# Patient Record
Sex: MALE | Race: BLACK OR AFRICAN AMERICAN | NOT HISPANIC OR LATINO | URBAN - METROPOLITAN AREA
[De-identification: names, ages, dates, MRNs, and addresses within clinical notes are randomized per-mention and may not be internally consistent; named-entity substitution may affect disease eponyms.]

---

## 2016-02-24 RX ORDER — TACROLIMUS 5 MG/1
4 CAPSULE ORAL
Qty: 0 | Refills: 0 | COMMUNITY
Start: 2016-02-24

## 2017-07-30 ENCOUNTER — EMERGENCY (EMERGENCY)
Facility: HOSPITAL | Age: 52
LOS: 1 days | Discharge: HOME | End: 2017-07-30
Attending: EMERGENCY MEDICINE | Admitting: EMERGENCY MEDICINE
Payer: MEDICARE

## 2017-07-30 VITALS
RESPIRATION RATE: 18 BRPM | OXYGEN SATURATION: 99 % | HEART RATE: 66 BPM | SYSTOLIC BLOOD PRESSURE: 159 MMHG | DIASTOLIC BLOOD PRESSURE: 74 MMHG

## 2017-07-30 VITALS
HEART RATE: 73 BPM | OXYGEN SATURATION: 98 % | DIASTOLIC BLOOD PRESSURE: 78 MMHG | SYSTOLIC BLOOD PRESSURE: 182 MMHG | TEMPERATURE: 98 F | RESPIRATION RATE: 18 BRPM

## 2017-07-30 DIAGNOSIS — Z94.0 KIDNEY TRANSPLANT STATUS: Chronic | ICD-10-CM

## 2017-07-30 LAB
ALBUMIN SERPL ELPH-MCNC: 4.6 G/DL — SIGNIFICANT CHANGE UP (ref 3.3–5)
ALP SERPL-CCNC: 70 U/L — SIGNIFICANT CHANGE UP (ref 40–120)
ALT FLD-CCNC: 19 U/L RC — SIGNIFICANT CHANGE UP (ref 10–45)
ANION GAP SERPL CALC-SCNC: 13 MMOL/L — SIGNIFICANT CHANGE UP (ref 5–17)
APPEARANCE UR: CLEAR — SIGNIFICANT CHANGE UP
APTT BLD: 32.6 SEC — SIGNIFICANT CHANGE UP (ref 27.5–37.4)
AST SERPL-CCNC: 23 U/L — SIGNIFICANT CHANGE UP (ref 10–40)
BACTERIA # UR AUTO: ABNORMAL /HPF
BASOPHILS # BLD AUTO: 0.1 K/UL — SIGNIFICANT CHANGE UP (ref 0–0.2)
BASOPHILS NFR BLD AUTO: 0.9 % — SIGNIFICANT CHANGE UP (ref 0–2)
BILIRUB SERPL-MCNC: 0.3 MG/DL — SIGNIFICANT CHANGE UP (ref 0.2–1.2)
BILIRUB UR-MCNC: NEGATIVE — SIGNIFICANT CHANGE UP
BUN SERPL-MCNC: 40 MG/DL — HIGH (ref 7–23)
CALCIUM SERPL-MCNC: 10.9 MG/DL — HIGH (ref 8.4–10.5)
CHLORIDE SERPL-SCNC: 101 MMOL/L — SIGNIFICANT CHANGE UP (ref 96–108)
CO2 SERPL-SCNC: 22 MMOL/L — SIGNIFICANT CHANGE UP (ref 22–31)
COLOR SPEC: COLORLESS — SIGNIFICANT CHANGE UP
CREAT SERPL-MCNC: 1.89 MG/DL — HIGH (ref 0.5–1.3)
DIFF PNL FLD: NEGATIVE — SIGNIFICANT CHANGE UP
EOSINOPHIL # BLD AUTO: 0.2 K/UL — SIGNIFICANT CHANGE UP (ref 0–0.5)
EOSINOPHIL NFR BLD AUTO: 2.4 % — SIGNIFICANT CHANGE UP (ref 0–6)
GLUCOSE SERPL-MCNC: 100 MG/DL — HIGH (ref 70–99)
GLUCOSE UR QL: NEGATIVE — SIGNIFICANT CHANGE UP
HCT VFR BLD CALC: 44.7 % — SIGNIFICANT CHANGE UP (ref 39–50)
HGB BLD-MCNC: 15.3 G/DL — SIGNIFICANT CHANGE UP (ref 13–17)
INR BLD: 0.95 RATIO — SIGNIFICANT CHANGE UP (ref 0.88–1.16)
KETONES UR-MCNC: NEGATIVE — SIGNIFICANT CHANGE UP
LEUKOCYTE ESTERASE UR-ACNC: NEGATIVE — SIGNIFICANT CHANGE UP
LYMPHOCYTES # BLD AUTO: 1.2 K/UL — SIGNIFICANT CHANGE UP (ref 1–3.3)
LYMPHOCYTES # BLD AUTO: 15.8 % — SIGNIFICANT CHANGE UP (ref 13–44)
MCHC RBC-ENTMCNC: 33.7 PG — SIGNIFICANT CHANGE UP (ref 27–34)
MCHC RBC-ENTMCNC: 34.2 GM/DL — SIGNIFICANT CHANGE UP (ref 32–36)
MCV RBC AUTO: 98.7 FL — SIGNIFICANT CHANGE UP (ref 80–100)
MONOCYTES # BLD AUTO: 0.7 K/UL — SIGNIFICANT CHANGE UP (ref 0–0.9)
MONOCYTES NFR BLD AUTO: 10 % — SIGNIFICANT CHANGE UP (ref 2–14)
NEUTROPHILS # BLD AUTO: 5.2 K/UL — SIGNIFICANT CHANGE UP (ref 1.8–7.4)
NEUTROPHILS NFR BLD AUTO: 71 % — SIGNIFICANT CHANGE UP (ref 43–77)
NITRITE UR-MCNC: NEGATIVE — SIGNIFICANT CHANGE UP
PH UR: 7 — SIGNIFICANT CHANGE UP (ref 5–8)
PLATELET # BLD AUTO: 130 K/UL — LOW (ref 150–400)
POTASSIUM SERPL-MCNC: 5.7 MMOL/L — HIGH (ref 3.5–5.3)
POTASSIUM SERPL-SCNC: 5.7 MMOL/L — HIGH (ref 3.5–5.3)
PROT SERPL-MCNC: 8.2 G/DL — SIGNIFICANT CHANGE UP (ref 6–8.3)
PROT UR-MCNC: NEGATIVE — SIGNIFICANT CHANGE UP
PROTHROM AB SERPL-ACNC: 10.3 SEC — SIGNIFICANT CHANGE UP (ref 9.8–12.7)
RBC # BLD: 4.53 M/UL — SIGNIFICANT CHANGE UP (ref 4.2–5.8)
RBC # FLD: 14 % — SIGNIFICANT CHANGE UP (ref 10.3–14.5)
RBC CASTS # UR COMP ASSIST: SIGNIFICANT CHANGE UP /HPF (ref 0–2)
SODIUM SERPL-SCNC: 136 MMOL/L — SIGNIFICANT CHANGE UP (ref 135–145)
SP GR SPEC: 1.01 — LOW (ref 1.01–1.02)
TACROLIMUS SERPL-MCNC: 7 NG/ML — SIGNIFICANT CHANGE UP
UROBILINOGEN FLD QL: NEGATIVE — SIGNIFICANT CHANGE UP
WBC # BLD: 7.4 K/UL — SIGNIFICANT CHANGE UP (ref 3.8–10.5)
WBC # FLD AUTO: 7.4 K/UL — SIGNIFICANT CHANGE UP (ref 3.8–10.5)
WBC UR QL: SIGNIFICANT CHANGE UP /HPF (ref 0–5)

## 2017-07-30 PROCEDURE — 85610 PROTHROMBIN TIME: CPT

## 2017-07-30 PROCEDURE — 87086 URINE CULTURE/COLONY COUNT: CPT

## 2017-07-30 PROCEDURE — 81001 URINALYSIS AUTO W/SCOPE: CPT

## 2017-07-30 PROCEDURE — 80197 ASSAY OF TACROLIMUS: CPT

## 2017-07-30 PROCEDURE — 99283 EMERGENCY DEPT VISIT LOW MDM: CPT

## 2017-07-30 PROCEDURE — 80053 COMPREHEN METABOLIC PANEL: CPT

## 2017-07-30 PROCEDURE — 99284 EMERGENCY DEPT VISIT MOD MDM: CPT | Mod: GC

## 2017-07-30 PROCEDURE — 85027 COMPLETE CBC AUTOMATED: CPT

## 2017-07-30 PROCEDURE — 85730 THROMBOPLASTIN TIME PARTIAL: CPT

## 2017-07-30 NOTE — ED PROVIDER NOTE - PROGRESS NOTE DETAILS
Spoke with patient's nephrologist, Dr. Nascimento, who states that patient's creatinine currently near baseline. No indication to keep patient in hospital. Will DC home with nephro follow up. Yahaira Arellano DO

## 2017-07-30 NOTE — ED PROVIDER NOTE - ATTENDING CONTRIBUTION TO CARE
I was physically present for the E/M service provided. I agree with above history, physical, and plan which I have reviewed and edited where appropriate. I was physically present for the key portions of the service provided.    50yo male PMH anemia, endocarditis, ESRD on HD, HIV, HTN, s/p renal transplant 2015 in [place] p/w right groin pain with urination.  -NAD, abdomen soft NTND, no CVA TTP, afebrile  -Check UA and renal function d/w pt's nephrologist

## 2017-07-30 NOTE — ED ADULT TRIAGE NOTE - CHIEF COMPLAINT QUOTE
R lower abdominal pain at kidney transplant site (2 years ago), saw nephrologist on wed/ thurs who stated an increase in creatinine   also c/o sore throat  denies hematuria R lower abdominal pain at kidney transplant site (2 years ago), patient states "pulling sensation"  saw nephrologist on wed/ thurs who stated an increase in creatinine   also c/o sore throat and increase in urinary frequency   denies hematuria

## 2017-07-30 NOTE — ED PROVIDER NOTE - PMH
Anemia  s/p transfusion 1 week ago 2013  Cerebral aneurysm without rupture    Endocarditis  10/ 2014 admitted & treated  ESRD on Dialysis  Tues /thurs/ saterday  GI (gastrointestinal bleed)    HIV (human immunodeficiency virus infection)    HTN - Hypertension    Human Immunodeficiency Virus [HIV] Disease  x 2010  Kidney transplanted    Mitral valve regurgitation    Sinus bradycardia  Pacemaker 10/2013  ST Umair Model 3327

## 2017-07-30 NOTE — ED ADULT NURSE NOTE - PMH
Anemia  s/p transfusion 1 week ago 2013  Cerebral aneurysm without rupture    Endocarditis  10/ 2014 admitted & treated  ESRD on Dialysis  Tues /thurs/ saterday  GI (gastrointestinal bleed)    HIV (human immunodeficiency virus infection)    HTN - Hypertension    Human Immunodeficiency Virus [HIV] Disease  x 2010  Kidney transplanted    Mitral valve regurgitation    Sinus bradycardia  Pacemaker 10/2013  ST Umair Model 8297

## 2017-07-30 NOTE — ED ADULT NURSE NOTE - OBJECTIVE STATEMENT
Pt is a 51YOM hx of anemia, endocarditis, ESRD, HIV, HTN, s/p renal transplant 2015([place])  received ambulatory A&Ox4 complaining of intermittent right groin pain during urination x1 week. Pt states that he saw his nephrologist last week and was told that his creatinine was elevated (3.0) Pt states that he was told to have his labs redrawn but "has been busy". Pt denies any pain at rest, no headache, dizziness, chest pain SOB nausea vomiting fever or chills. Patient concerned that his kidney is failing.

## 2017-07-30 NOTE — ED PROVIDER NOTE - OBJECTIVE STATEMENT
52yo male PMH anemia, endocarditis, ESRD on HD, HIV, HTN, s/p renal transplant 2015 in [place] presenting with right groin pain during urination, saw his nephrologist last week and was told that his creatinine was elevated. Patient concerned that his kidney is failing.     Nephro: Dr. Alvarez Daugherty/Radha

## 2017-07-30 NOTE — ED ADULT NURSE NOTE - CHIEF COMPLAINT QUOTE
R lower abdominal pain at kidney transplant site (2 years ago), patient states "pulling sensation"  saw nephrologist on wed/ thurs who stated an increase in creatinine   also c/o sore throat and increase in urinary frequency   denies hematuria

## 2017-07-30 NOTE — ED PROVIDER NOTE - PLAN OF CARE
1. Follow up with your nephrologist within 2-3days for reevaluation.  2.  Return to the Emergency Department for worsening, progressive or any other concerning symptoms.

## 2017-07-30 NOTE — ED PROVIDER NOTE - MEDICAL DECISION MAKING DETAILS
50yo male with PMH HIV, renal transplant presenting with right groin pulling and concern for rejection, will check labs, UA, talk to patient's nephrologist, reevaluate. Yahaira Arellano DO

## 2017-07-30 NOTE — ED PROVIDER NOTE - CARE PLAN
Principal Discharge DX:	Groin discomfort, right  Instructions for follow-up, activity and diet:	1. Follow up with your nephrologist within 2-3days for reevaluation.  2.  Return to the Emergency Department for worsening, progressive or any other concerning symptoms.
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
## 2017-07-30 NOTE — ED PROVIDER NOTE - PHYSICAL EXAMINATION
Gen: NAD, AOx3  Head: NCAT  Lung: CTAB, no respiratory distress, no wheezing, rales, rhonchi  CV: normal s1/s2, rrr, no murmurs, Normal perfusion, pulses 2+ throughout  Abd: soft, NTND, no CVA tenderness  MSK: No edema, no visible deformities, full range of motion in all 4 extremities  Neuro: CN II-XII grossly intact, No focal neurologic deficits  Skin: No rash   Psych: normal affect

## 2017-07-31 LAB
CULTURE RESULTS: NO GROWTH — SIGNIFICANT CHANGE UP
SPECIMEN SOURCE: SIGNIFICANT CHANGE UP

## 2017-12-13 ENCOUNTER — APPOINTMENT (OUTPATIENT)
Dept: NEPHROLOGY | Facility: CLINIC | Age: 52
End: 2017-12-13

## 2018-07-12 ENCOUNTER — APPOINTMENT (OUTPATIENT)
Dept: NEPHROLOGY | Facility: CLINIC | Age: 53
End: 2018-07-12
Payer: MEDICARE

## 2018-07-12 VITALS
SYSTOLIC BLOOD PRESSURE: 155 MMHG | BODY MASS INDEX: 22.26 KG/M2 | HEIGHT: 73 IN | HEART RATE: 77 BPM | TEMPERATURE: 98.3 F | WEIGHT: 168 LBS | DIASTOLIC BLOOD PRESSURE: 67 MMHG | RESPIRATION RATE: 14 BRPM | OXYGEN SATURATION: 96 %

## 2018-07-12 DIAGNOSIS — I35.9 NONRHEUMATIC AORTIC VALVE DISORDER, UNSPECIFIED: ICD-10-CM

## 2018-07-12 DIAGNOSIS — B20 HUMAN IMMUNODEFICIENCY VIRUS [HIV] DISEASE: ICD-10-CM

## 2018-07-12 PROCEDURE — 99205 OFFICE O/P NEW HI 60 MIN: CPT

## 2018-07-27 LAB
ALBUMIN SERPL ELPH-MCNC: 4.5 G/DL
ALP BLD-CCNC: 74 U/L
ALT SERPL-CCNC: 16 U/L
ANION GAP SERPL CALC-SCNC: 17 MMOL/L
APPEARANCE: CLEAR
AST SERPL-CCNC: 28 U/L
BASOPHILS # BLD AUTO: 0.06 K/UL
BASOPHILS NFR BLD AUTO: 1.1 %
BILIRUB SERPL-MCNC: 0.4 MG/DL
BILIRUBIN URINE: NEGATIVE
BKV DNA SPEC QL NAA+PROBE: NORMAL
BLOOD URINE: NEGATIVE
BUN SERPL-MCNC: 34 MG/DL
CALCIUM SERPL-MCNC: 11.3 MG/DL
CD3 CELLS # BLD: 1036 /UL
CD3 CELLS NFR BLD: 59 %
CD3+CD4+ CELLS # BLD: 513 /UL
CD3+CD4+ CELLS NFR BLD: 29 %
CD3+CD4+ CELLS/CD3+CD8+ CLL SPEC: 1.08 RATIO
CD3+CD8+ CELLS # SPEC: 475 /UL
CD3+CD8+ CELLS NFR BLD: 27 %
CHLORIDE SERPL-SCNC: 106 MMOL/L
CHOLEST SERPL-MCNC: 182 MG/DL
CHOLEST/HDLC SERPL: 2.5 RATIO
CMV DNA SPEC QL NAA+PROBE: NOT DETECTED IU/ML
CO2 SERPL-SCNC: 17 MMOL/L
COLOR: YELLOW
CREAT SERPL-MCNC: 2.58 MG/DL
CREAT SPEC-SCNC: 253 MG/DL
CREAT/PROT UR: 0.1 RATIO
EOSINOPHIL # BLD AUTO: 0.29 K/UL
EOSINOPHIL NFR BLD AUTO: 5.2 %
GLUCOSE QUALITATIVE U: NEGATIVE MG/DL
GLUCOSE SERPL-MCNC: 66 MG/DL
HBA1C MFR BLD HPLC: 5.4 %
HCT VFR BLD CALC: 45.4 %
HDLC SERPL-MCNC: 72 MG/DL
HGB BLD-MCNC: 14.6 G/DL
HIV1 RNA # SERPL NAA+PROBE: ABNORMAL
HIV1 RNA # SERPL NAA+PROBE: ABNORMAL COPIES/ML
IMM GRANULOCYTES NFR BLD AUTO: 0.9 %
KETONES URINE: NEGATIVE
LDH SERPL-CCNC: 360 U/L
LDLC SERPL CALC-MCNC: 95 MG/DL
LEUKOCYTE ESTERASE URINE: NEGATIVE
LYMPHOCYTES # BLD AUTO: 1.75 K/UL
LYMPHOCYTES NFR BLD AUTO: 31.1 %
MAGNESIUM SERPL-MCNC: 2.1 MG/DL
MAN DIFF?: NORMAL
MCHC RBC-ENTMCNC: 30.5 PG
MCHC RBC-ENTMCNC: 32.2 GM/DL
MCV RBC AUTO: 95 FL
MONOCYTES # BLD AUTO: 0.62 K/UL
MONOCYTES NFR BLD AUTO: 11 %
NEUTROPHILS # BLD AUTO: 2.86 K/UL
NEUTROPHILS NFR BLD AUTO: 50.7 %
NITRITE URINE: NEGATIVE
PH URINE: 5
PHOSPHATE SERPL-MCNC: 3.5 MG/DL
PLATELET # BLD AUTO: 138 K/UL
POTASSIUM SERPL-SCNC: 5.8 MMOL/L
PROT SERPL-MCNC: 8.4 G/DL
PROT UR-MCNC: 15 MG/DL
PROTEIN URINE: NEGATIVE MG/DL
RBC # BLD: 4.78 M/UL
RBC # FLD: 15.7 %
SODIUM SERPL-SCNC: 140 MMOL/L
SPECIFIC GRAVITY URINE: 1.02
TACROLIMUS SERPL-MCNC: 11.4 NG/ML
TRIGL SERPL-MCNC: 75 MG/DL
URATE SERPL-MCNC: 7.8 MG/DL
UROBILINOGEN URINE: NEGATIVE MG/DL
VIRAL LOAD INTERP: NORMAL
VIRAL LOAD LOG: ABNORMAL LG COP/ML
WBC # FLD AUTO: 5.63 K/UL

## 2018-08-01 ENCOUNTER — APPOINTMENT (OUTPATIENT)
Dept: INFECTIOUS DISEASE | Facility: CLINIC | Age: 53
End: 2018-08-01
Payer: MEDICARE

## 2018-08-01 VITALS
BODY MASS INDEX: 21.87 KG/M2 | OXYGEN SATURATION: 99 % | SYSTOLIC BLOOD PRESSURE: 196 MMHG | TEMPERATURE: 98.5 F | DIASTOLIC BLOOD PRESSURE: 99 MMHG | HEART RATE: 85 BPM | HEIGHT: 73 IN | WEIGHT: 165 LBS

## 2018-08-01 VITALS — DIASTOLIC BLOOD PRESSURE: 82 MMHG | SYSTOLIC BLOOD PRESSURE: 164 MMHG

## 2018-08-01 DIAGNOSIS — Z92.89 PERSONAL HISTORY OF OTHER MEDICAL TREATMENT: ICD-10-CM

## 2018-08-01 DIAGNOSIS — I10 ESSENTIAL (PRIMARY) HYPERTENSION: ICD-10-CM

## 2018-08-01 PROCEDURE — 99205 OFFICE O/P NEW HI 60 MIN: CPT

## 2018-08-03 PROBLEM — I10 HYPERTENSION: Status: ACTIVE | Noted: 2018-08-03

## 2018-08-03 LAB
25(OH)D3 SERPL-MCNC: 24.6 NG/ML
ALBUMIN SERPL ELPH-MCNC: 4.5 G/DL
ALP BLD-CCNC: 72 U/L
ALT SERPL-CCNC: 10 U/L
ANION GAP SERPL CALC-SCNC: 14 MMOL/L
AST SERPL-CCNC: 23 U/L
BILIRUB SERPL-MCNC: 0.3 MG/DL
BUN SERPL-MCNC: 33 MG/DL
C TRACH RRNA SPEC QL NAA+PROBE: NOT DETECTED
CALCIUM SERPL-MCNC: 11 MG/DL
CHLORIDE SERPL-SCNC: 104 MMOL/L
CO2 SERPL-SCNC: 20 MMOL/L
CREAT SERPL-MCNC: 2.45 MG/DL
GLUCOSE SERPL-MCNC: 84 MG/DL
HBV CORE IGG+IGM SER QL: REACTIVE
HBV SURFACE AB SER QL: REACTIVE
HBV SURFACE AG SER QL: NONREACTIVE
HCV AB SER QL: NONREACTIVE
HCV S/CO RATIO: 0.09 S/CO
HEPATITIS A IGG ANTIBODY: NONREACTIVE
N GONORRHOEA RRNA SPEC QL NAA+PROBE: NOT DETECTED
POTASSIUM SERPL-SCNC: 5 MMOL/L
PROT SERPL-MCNC: 8 G/DL
PSA SERPL-MCNC: 1.9 NG/ML
SODIUM SERPL-SCNC: 138 MMOL/L
SOURCE AMPLIFICATION: NORMAL
T PALLIDUM AB SER QL IA: NEGATIVE

## 2018-08-09 ENCOUNTER — RX RENEWAL (OUTPATIENT)
Age: 53
End: 2018-08-09

## 2018-08-13 ENCOUNTER — MEDICATION RENEWAL (OUTPATIENT)
Age: 53
End: 2018-08-13

## 2018-08-16 ENCOUNTER — APPOINTMENT (OUTPATIENT)
Dept: ULTRASOUND IMAGING | Facility: HOSPITAL | Age: 53
End: 2018-08-16

## 2018-08-16 ENCOUNTER — RX RENEWAL (OUTPATIENT)
Age: 53
End: 2018-08-16

## 2018-09-19 ENCOUNTER — MEDICATION RENEWAL (OUTPATIENT)
Age: 53
End: 2018-09-19

## 2018-09-20 LAB
CALCIUM SERPL-MCNC: 11 MG/DL
PARATHYROID HORMONE INTACT: 134 PG/ML

## 2018-12-31 ENCOUNTER — RX RENEWAL (OUTPATIENT)
Age: 53
End: 2018-12-31

## 2019-03-11 ENCOUNTER — APPOINTMENT (OUTPATIENT)
Dept: INFECTIOUS DISEASE | Facility: CLINIC | Age: 54
End: 2019-03-11

## 2019-04-19 ENCOUNTER — INPATIENT (INPATIENT)
Facility: HOSPITAL | Age: 54
LOS: 3 days | Discharge: HOME | DRG: 871 | End: 2019-04-23
Attending: INTERNAL MEDICINE | Admitting: INTERNAL MEDICINE
Payer: MEDICARE

## 2019-04-19 VITALS
DIASTOLIC BLOOD PRESSURE: 92 MMHG | WEIGHT: 160.06 LBS | HEIGHT: 73 IN | RESPIRATION RATE: 18 BRPM | TEMPERATURE: 98 F | HEART RATE: 96 BPM | OXYGEN SATURATION: 95 % | SYSTOLIC BLOOD PRESSURE: 202 MMHG

## 2019-04-19 DIAGNOSIS — B20 HUMAN IMMUNODEFICIENCY VIRUS [HIV] DISEASE: ICD-10-CM

## 2019-04-19 DIAGNOSIS — R06.02 SHORTNESS OF BREATH: ICD-10-CM

## 2019-04-19 DIAGNOSIS — Z94.0 KIDNEY TRANSPLANT STATUS: ICD-10-CM

## 2019-04-19 DIAGNOSIS — Z29.9 ENCOUNTER FOR PROPHYLACTIC MEASURES, UNSPECIFIED: ICD-10-CM

## 2019-04-19 DIAGNOSIS — I10 ESSENTIAL (PRIMARY) HYPERTENSION: ICD-10-CM

## 2019-04-19 DIAGNOSIS — D89.9 DISORDER INVOLVING THE IMMUNE MECHANISM, UNSPECIFIED: ICD-10-CM

## 2019-04-19 DIAGNOSIS — J43.9 EMPHYSEMA, UNSPECIFIED: ICD-10-CM

## 2019-04-19 DIAGNOSIS — Z94.0 KIDNEY TRANSPLANT STATUS: Chronic | ICD-10-CM

## 2019-04-19 LAB
4/8 RATIO: 1.26 RATIO — SIGNIFICANT CHANGE UP (ref 0.9–3.6)
ABS CD8: 401 /UL — SIGNIFICANT CHANGE UP (ref 142–740)
ALBUMIN SERPL ELPH-MCNC: 4.4 G/DL — SIGNIFICANT CHANGE UP (ref 3.3–5)
ALP SERPL-CCNC: 87 U/L — SIGNIFICANT CHANGE UP (ref 40–120)
ALT FLD-CCNC: 15 U/L — SIGNIFICANT CHANGE UP (ref 10–45)
ANION GAP SERPL CALC-SCNC: 9 MMOL/L — SIGNIFICANT CHANGE UP (ref 5–17)
APPEARANCE UR: CLEAR — SIGNIFICANT CHANGE UP
APTT BLD: 33.3 SEC — SIGNIFICANT CHANGE UP (ref 27.5–36.3)
AST SERPL-CCNC: 29 U/L — SIGNIFICANT CHANGE UP (ref 10–40)
BASOPHILS # BLD AUTO: 0.1 K/UL — SIGNIFICANT CHANGE UP (ref 0–0.2)
BILIRUB SERPL-MCNC: 0.4 MG/DL — SIGNIFICANT CHANGE UP (ref 0.2–1.2)
BILIRUB UR-MCNC: NEGATIVE — SIGNIFICANT CHANGE UP
BUN SERPL-MCNC: 39 MG/DL — HIGH (ref 7–23)
CALCIUM SERPL-MCNC: 11.1 MG/DL — HIGH (ref 8.4–10.5)
CD3 BLASTS SPEC-ACNC: 49 % — LOW (ref 59–83)
CD3 BLASTS SPEC-ACNC: 986 /UL — SIGNIFICANT CHANGE UP (ref 672–1870)
CD4 %: 25 % — LOW (ref 30–62)
CD8 %: 20 % — SIGNIFICANT CHANGE UP (ref 12–36)
CHLORIDE SERPL-SCNC: 104 MMOL/L — SIGNIFICANT CHANGE UP (ref 96–108)
CO2 SERPL-SCNC: 22 MMOL/L — SIGNIFICANT CHANGE UP (ref 22–31)
COLOR SPEC: SIGNIFICANT CHANGE UP
CREAT SERPL-MCNC: 2.21 MG/DL — HIGH (ref 0.5–1.3)
DIFF PNL FLD: NEGATIVE — SIGNIFICANT CHANGE UP
EOSINOPHIL # BLD AUTO: 5.6 K/UL — HIGH (ref 0–0.5)
EOSINOPHIL NFR BLD AUTO: 41 % — HIGH (ref 0–6)
GAS PNL BLDV: SIGNIFICANT CHANGE UP
GLUCOSE SERPL-MCNC: 113 MG/DL — HIGH (ref 70–99)
GLUCOSE UR QL: NEGATIVE — SIGNIFICANT CHANGE UP
HCT VFR BLD CALC: 43.2 % — SIGNIFICANT CHANGE UP (ref 39–50)
HGB BLD-MCNC: 13.7 G/DL — SIGNIFICANT CHANGE UP (ref 13–17)
INR BLD: 1.03 RATIO — SIGNIFICANT CHANGE UP (ref 0.88–1.16)
KETONES UR-MCNC: NEGATIVE — SIGNIFICANT CHANGE UP
LDH SERPL L TO P-CCNC: 329 U/L — HIGH (ref 50–242)
LEUKOCYTE ESTERASE UR-ACNC: NEGATIVE — SIGNIFICANT CHANGE UP
LYMPHOCYTES # BLD AUTO: 1.6 K/UL — SIGNIFICANT CHANGE UP (ref 1–3.3)
LYMPHOCYTES # BLD AUTO: 17 % — SIGNIFICANT CHANGE UP (ref 13–44)
MCHC RBC-ENTMCNC: 30 PG — SIGNIFICANT CHANGE UP (ref 27–34)
MCHC RBC-ENTMCNC: 31.6 GM/DL — LOW (ref 32–36)
MCV RBC AUTO: 95.1 FL — SIGNIFICANT CHANGE UP (ref 80–100)
MONOCYTES # BLD AUTO: 1 K/UL — HIGH (ref 0–0.9)
MONOCYTES NFR BLD AUTO: 5 % — SIGNIFICANT CHANGE UP (ref 2–14)
NEUTROPHILS # BLD AUTO: 3.4 K/UL — SIGNIFICANT CHANGE UP (ref 1.8–7.4)
NEUTROPHILS NFR BLD AUTO: 37 % — LOW (ref 43–77)
NITRITE UR-MCNC: NEGATIVE — SIGNIFICANT CHANGE UP
PH UR: 5.5 — SIGNIFICANT CHANGE UP (ref 5–8)
PLATELET # BLD AUTO: 156 K/UL — SIGNIFICANT CHANGE UP (ref 150–400)
POTASSIUM SERPL-MCNC: 5.1 MMOL/L — SIGNIFICANT CHANGE UP (ref 3.5–5.3)
POTASSIUM SERPL-SCNC: 5.1 MMOL/L — SIGNIFICANT CHANGE UP (ref 3.5–5.3)
PROT SERPL-MCNC: 8.5 G/DL — HIGH (ref 6–8.3)
PROT UR-MCNC: NEGATIVE — SIGNIFICANT CHANGE UP
PROTHROM AB SERPL-ACNC: 11.9 SEC — SIGNIFICANT CHANGE UP (ref 10–12.9)
RAPID RVP RESULT: SIGNIFICANT CHANGE UP
RBC # BLD: 4.54 M/UL — SIGNIFICANT CHANGE UP (ref 4.2–5.8)
RBC # FLD: 14.8 % — HIGH (ref 10.3–14.5)
SODIUM SERPL-SCNC: 135 MMOL/L — SIGNIFICANT CHANGE UP (ref 135–145)
SP GR SPEC: 1.01 — SIGNIFICANT CHANGE UP (ref 1.01–1.02)
T-CELL CD4 SUBSET PNL BLD: 506 /UL — SIGNIFICANT CHANGE UP (ref 489–1457)
TACROLIMUS SERPL-MCNC: 3.3 NG/ML — SIGNIFICANT CHANGE UP
TROPONIN T, HIGH SENSITIVITY RESULT: 13 NG/L — SIGNIFICANT CHANGE UP (ref 0–51)
TROPONIN T, HIGH SENSITIVITY RESULT: 14 NG/L — SIGNIFICANT CHANGE UP (ref 0–51)
UROBILINOGEN FLD QL: NEGATIVE — SIGNIFICANT CHANGE UP
WBC # BLD: 11.6 K/UL — HIGH (ref 3.8–10.5)
WBC # FLD AUTO: 11.6 K/UL — HIGH (ref 3.8–10.5)

## 2019-04-19 PROCEDURE — 99222 1ST HOSP IP/OBS MODERATE 55: CPT | Mod: GC

## 2019-04-19 PROCEDURE — 71046 X-RAY EXAM CHEST 2 VIEWS: CPT | Mod: 26

## 2019-04-19 PROCEDURE — 93010 ELECTROCARDIOGRAM REPORT: CPT

## 2019-04-19 PROCEDURE — 99223 1ST HOSP IP/OBS HIGH 75: CPT

## 2019-04-19 PROCEDURE — 71250 CT THORAX DX C-: CPT | Mod: 26

## 2019-04-19 PROCEDURE — 76776 US EXAM K TRANSPL W/DOPPLER: CPT | Mod: 26,RT

## 2019-04-19 PROCEDURE — 99291 CRITICAL CARE FIRST HOUR: CPT | Mod: GC

## 2019-04-19 RX ORDER — IPRATROPIUM/ALBUTEROL SULFATE 18-103MCG
3 AEROSOL WITH ADAPTER (GRAM) INHALATION EVERY 6 HOURS
Qty: 0 | Refills: 0 | Status: DISCONTINUED | OUTPATIENT
Start: 2019-04-19 | End: 2019-04-19

## 2019-04-19 RX ORDER — IPRATROPIUM/ALBUTEROL SULFATE 18-103MCG
3 AEROSOL WITH ADAPTER (GRAM) INHALATION EVERY 4 HOURS
Qty: 0 | Refills: 0 | Status: DISCONTINUED | OUTPATIENT
Start: 2019-04-19 | End: 2019-04-23

## 2019-04-19 RX ORDER — HYDRALAZINE HCL 50 MG
25 TABLET ORAL EVERY 8 HOURS
Qty: 0 | Refills: 0 | Status: DISCONTINUED | OUTPATIENT
Start: 2019-04-19 | End: 2019-04-23

## 2019-04-19 RX ORDER — ATOVAQUONE 750 MG/5ML
SUSPENSION ORAL
Qty: 0 | Refills: 0 | Status: DISCONTINUED | OUTPATIENT
Start: 2019-04-19 | End: 2019-04-23

## 2019-04-19 RX ORDER — HEPARIN SODIUM 5000 [USP'U]/ML
5000 INJECTION INTRAVENOUS; SUBCUTANEOUS EVERY 8 HOURS
Qty: 0 | Refills: 0 | Status: DISCONTINUED | OUTPATIENT
Start: 2019-04-19 | End: 2019-04-23

## 2019-04-19 RX ORDER — LAMIVUDINE 150 MG
150 TABLET ORAL DAILY
Qty: 0 | Refills: 0 | Status: DISCONTINUED | OUTPATIENT
Start: 2019-04-19 | End: 2019-04-23

## 2019-04-19 RX ORDER — AZITHROMYCIN 500 MG/1
500 TABLET, FILM COATED ORAL ONCE
Qty: 0 | Refills: 0 | Status: COMPLETED | OUTPATIENT
Start: 2019-04-19 | End: 2019-04-19

## 2019-04-19 RX ORDER — DOLUTEGRAVIR SODIUM 25 MG/1
50 TABLET, FILM COATED ORAL DAILY
Qty: 0 | Refills: 0 | Status: DISCONTINUED | OUTPATIENT
Start: 2019-04-19 | End: 2019-04-23

## 2019-04-19 RX ORDER — CEFTRIAXONE 500 MG/1
1 INJECTION, POWDER, FOR SOLUTION INTRAMUSCULAR; INTRAVENOUS EVERY 24 HOURS
Qty: 0 | Refills: 0 | Status: DISCONTINUED | OUTPATIENT
Start: 2019-04-20 | End: 2019-04-23

## 2019-04-19 RX ORDER — CEFTRIAXONE 500 MG/1
INJECTION, POWDER, FOR SOLUTION INTRAMUSCULAR; INTRAVENOUS
Qty: 0 | Refills: 0 | Status: DISCONTINUED | OUTPATIENT
Start: 2019-04-19 | End: 2019-04-23

## 2019-04-19 RX ORDER — METOPROLOL TARTRATE 50 MG
25 TABLET ORAL ONCE
Qty: 0 | Refills: 0 | Status: COMPLETED | OUTPATIENT
Start: 2019-04-19 | End: 2019-04-19

## 2019-04-19 RX ORDER — ALBUTEROL 90 UG/1
2 AEROSOL, METERED ORAL EVERY 4 HOURS
Qty: 0 | Refills: 0 | Status: DISCONTINUED | OUTPATIENT
Start: 2019-04-19 | End: 2019-04-19

## 2019-04-19 RX ORDER — IPRATROPIUM/ALBUTEROL SULFATE 18-103MCG
3 AEROSOL WITH ADAPTER (GRAM) INHALATION ONCE
Qty: 0 | Refills: 0 | Status: COMPLETED | OUTPATIENT
Start: 2019-04-19 | End: 2019-04-19

## 2019-04-19 RX ORDER — AZITHROMYCIN 500 MG/1
500 TABLET, FILM COATED ORAL EVERY 24 HOURS
Qty: 0 | Refills: 0 | Status: DISCONTINUED | OUTPATIENT
Start: 2019-04-20 | End: 2019-04-23

## 2019-04-19 RX ORDER — BUDESONIDE, MICRONIZED 100 %
0.5 POWDER (GRAM) MISCELLANEOUS
Qty: 0 | Refills: 0 | Status: DISCONTINUED | OUTPATIENT
Start: 2019-04-19 | End: 2019-04-23

## 2019-04-19 RX ORDER — FUROSEMIDE 40 MG
20 TABLET ORAL DAILY
Qty: 0 | Refills: 0 | Status: DISCONTINUED | OUTPATIENT
Start: 2019-04-19 | End: 2019-04-20

## 2019-04-19 RX ORDER — ATOVAQUONE 750 MG/5ML
750 SUSPENSION ORAL
Qty: 0 | Refills: 0 | Status: DISCONTINUED | OUTPATIENT
Start: 2019-04-20 | End: 2019-04-23

## 2019-04-19 RX ORDER — TACROLIMUS 5 MG/1
4 CAPSULE ORAL EVERY 12 HOURS
Qty: 0 | Refills: 0 | Status: DISCONTINUED | OUTPATIENT
Start: 2019-04-19 | End: 2019-04-21

## 2019-04-19 RX ORDER — AZITHROMYCIN 500 MG/1
TABLET, FILM COATED ORAL
Qty: 0 | Refills: 0 | Status: DISCONTINUED | OUTPATIENT
Start: 2019-04-19 | End: 2019-04-23

## 2019-04-19 RX ORDER — METOPROLOL TARTRATE 50 MG
25 TABLET ORAL DAILY
Qty: 0 | Refills: 0 | Status: DISCONTINUED | OUTPATIENT
Start: 2019-04-19 | End: 2019-04-23

## 2019-04-19 RX ORDER — ATOVAQUONE 750 MG/5ML
750 SUSPENSION ORAL ONCE
Qty: 0 | Refills: 0 | Status: COMPLETED | OUTPATIENT
Start: 2019-04-19 | End: 2019-04-19

## 2019-04-19 RX ORDER — ABACAVIR 20 MG/ML
600 SOLUTION ORAL DAILY
Qty: 0 | Refills: 0 | Status: DISCONTINUED | OUTPATIENT
Start: 2019-04-19 | End: 2019-04-23

## 2019-04-19 RX ORDER — CEFTRIAXONE 500 MG/1
1 INJECTION, POWDER, FOR SOLUTION INTRAMUSCULAR; INTRAVENOUS ONCE
Qty: 0 | Refills: 0 | Status: COMPLETED | OUTPATIENT
Start: 2019-04-19 | End: 2019-04-19

## 2019-04-19 RX ADMIN — Medication 0.5 MILLIGRAM(S): at 19:59

## 2019-04-19 RX ADMIN — Medication 60 MILLIGRAM(S): at 21:43

## 2019-04-19 RX ADMIN — Medication 25 MILLIGRAM(S): at 21:45

## 2019-04-19 RX ADMIN — Medication 3 MILLILITER(S): at 15:52

## 2019-04-19 RX ADMIN — ALBUTEROL 2 PUFF(S): 90 AEROSOL, METERED ORAL at 17:38

## 2019-04-19 RX ADMIN — ABACAVIR 600 MILLIGRAM(S): 20 SOLUTION ORAL at 17:37

## 2019-04-19 RX ADMIN — Medication 25 MILLIGRAM(S): at 09:32

## 2019-04-19 RX ADMIN — Medication 20 MILLIGRAM(S): at 15:52

## 2019-04-19 RX ADMIN — ATOVAQUONE 750 MILLIGRAM(S): 750 SUSPENSION ORAL at 19:45

## 2019-04-19 RX ADMIN — Medication 3 MILLILITER(S): at 09:32

## 2019-04-19 RX ADMIN — DOLUTEGRAVIR SODIUM 50 MILLIGRAM(S): 25 TABLET, FILM COATED ORAL at 17:37

## 2019-04-19 RX ADMIN — Medication 150 MILLIGRAM(S): at 17:37

## 2019-04-19 RX ADMIN — Medication 600 MILLIGRAM(S): at 19:46

## 2019-04-19 RX ADMIN — CEFTRIAXONE 100 GRAM(S): 500 INJECTION, POWDER, FOR SOLUTION INTRAMUSCULAR; INTRAVENOUS at 21:43

## 2019-04-19 RX ADMIN — Medication 3 MILLILITER(S): at 21:43

## 2019-04-19 RX ADMIN — AZITHROMYCIN 250 MILLIGRAM(S): 500 TABLET, FILM COATED ORAL at 22:45

## 2019-04-19 RX ADMIN — Medication 3 MILLILITER(S): at 09:31

## 2019-04-19 RX ADMIN — TACROLIMUS 4 MILLIGRAM(S): 5 CAPSULE ORAL at 17:37

## 2019-04-19 NOTE — CHART NOTE - NSCHARTNOTEFT_GEN_A_CORE
pulm consult ( house )called- pt te be seen tomorrow, pt may have Solumedrol 60 mg IV today for wheezing if necessary as per pulmonary Dr. Mcqueen.    Merari Duran NP-C  #[id number]

## 2019-04-19 NOTE — CONSULT NOTE ADULT - PROBLEM SELECTOR RECOMMENDATION 2
Patient was on tacrolimus 4 mg BID as outpatient. Continue with tacrolimus same dose of immunosuppressant medications.  Pt. states he was supposed to be on prednisone however he has not taken medication for the last 2 years and dapsone prophylaxis but has been non adherent.  Monitor daily FK levels( check 30 minutes prior to AM dose).
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
## 2019-04-19 NOTE — ED ADULT NURSE NOTE - PMH
Anemia  s/p transfusion 1 week ago 2013  Cerebral aneurysm without rupture    Endocarditis  10/ 2014 admitted & treated  ESRD on Dialysis  Tues /thurs/ saterday  GI (gastrointestinal bleed)    HIV (human immunodeficiency virus infection)    HTN - Hypertension    Human Immunodeficiency Virus [HIV] Disease  x 2010  Kidney transplanted    Mitral valve regurgitation    Sinus bradycardia  Pacemaker 10/2013  [employer] Umair Model 6782

## 2019-04-19 NOTE — H&P ADULT - NSICDXPASTMEDICALHX_GEN_ALL_CORE_FT
PAST MEDICAL HISTORY:  Anemia s/p transfusion 1 week ago 2013    Cerebral aneurysm without rupture     Endocarditis 10/ 2014 admitted & treated    ESRD on Dialysis Tues /thurs/ saterday    GI (gastrointestinal bleed)     HIV (human immunodeficiency virus infection)     HTN - Hypertension     Human Immunodeficiency Virus [HIV] Disease x 2010    Kidney transplanted     Mitral valve regurgitation     Sinus bradycardia Pacemaker 10/2013  ST Umair Model 2219
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
## 2019-04-19 NOTE — H&P ADULT - PROBLEM SELECTOR PLAN 1
-Secondary to COPD exacerbation vs fluid overload vs infectious etiology (less likely)  -CT chest questionable PNA vs Bronchiolitis  -No fevers, chills, or elevated white count. RVP Negative as well therefore unlikely infectious etiology  -Elevated ProBNP  -Appropriate response to DuoNeb  -Continue Duoneb q6hr ATC + Albuterol prn  -Mucinex Q12h  -F/u ECHO  -Lasix 20mg IV daily.   -Monitor O2 sats
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
## 2019-04-19 NOTE — H&P ADULT - PROBLEM SELECTOR PLAN 4
-Chronic  -No indication of AIDs  -Follow up with Viral load  -Continue home HAART meds -Tranplant in [place] in 2015  -Lost to follow up after 2 years due to Travel from [facility] to [place]  -Noncompliant with certain medications  -Tacrolimus levels wnl  -Follow up with Transplant consult  -Monitor renal function

## 2019-04-19 NOTE — H&P ADULT - PROBLEM SELECTOR PROBLEM 4
Pulmonary emphysema, unspecified emphysema type HIV infection, unspecified symptom status Kidney transplanted

## 2019-04-19 NOTE — H&P ADULT - ASSESSMENT
54yo male with hx of HIV, ESRD s/p Right kidney transplant, Moderate AS, MVR s/p MV Repair (Bovine), HTN, Cerebral Aneurysm (without rupture), Endocarditis, Bradycardia s/p PPM, Coumadin in the past for PAF, Anemia, presented to the ED with c/o sob/productive cough x 1 week.

## 2019-04-19 NOTE — H&P ADULT - HISTORY OF PRESENT ILLNESS
52yo male with hx of HIV, ESRD s/p Right kidney transplant, Moderate AS, MVR s/p MV Repair (Bovine), HTN, Cerebral Aneurysm (without rupture), Endocarditis, Bradycardia s/p PPM, Coumadin in the past for PAF, Anemia, presented to the ED with c/o sob/productive cough x 1 week. Pt states his symptoms of sob have progressively worsened to intermitted symptoms while at rest. He states initially he had symptoms of cough which progressed to producing yellow phlegm. He also developed sob with activity. It later progressed to short walks leading to sob. He states this week his symptoms worsened at work where while at rest he would develop difficulty breathing. He denies fever, chills, cp, palpitations, recent sick contact.   Pt also states he has been noncompliant with medications at times. He was prescribed lasix 20mg daily, however, he has not been taking it for a while. The same goes for majority of his medication. Pt states he is in denial of having uncontrolled htn or worsening of his medical conditions. He states he feels fine and therefore stops taking medication without consulting his physicians. He is aware of this being a wrong practice.

## 2019-04-19 NOTE — CONSULT NOTE ADULT - ASSESSMENT
54yo male with hx of HIV, ESRD s/p Right kidney transplant, Moderate AS, MVR s/p MV Repair (Bovine), HTN, Cerebral Aneurysm (without rupture), Endocarditis, Bradycardia s/p PPM, Coumadin in the past for PAF, Anemia, presented to the ED with c/o sob/productive cough x 1 week.  Chest CT with upper lobe predominant ground-glass and nodular opacities.     SOB from COPD exacerbation or cardiac wheezing from CHF or both - r/o endocarditis   h/o renal transplant on immunosuppressants   h/o prior MVR (repair)- h/o endocarditis   HIV disease     Suggest:  *Night sweats, h/o prior endocarditis, h/o valve replacement -    Check blood cx- obtained in a sterile manner and each bottle with at least 10 cc of blood    Check echo - to assess integrity of valve, Assess EF, Check for vegetations.     Cardiology follow up    No antibiotics until blood cx are obtained     Repeat RVP      *CT findings of b/l upper lobe nodular and ground glass opacities    Differentials include CAP, Septic emboli (if endocarditis), bronchiolitis    After 2 sets of blood cx, can start Azithromycin and Ceftriaxone for CAP     *s/p renal transplant, works in homeless shelter    check QuantiFeron    start bactrim     * HIV    check DC4 and HIV viral load    c/w Abacavir, Lamivudine and Tivicay       D/W team
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
## 2019-04-19 NOTE — ED PROVIDER NOTE - ATTENDING CONTRIBUTION TO CARE
54 yo male hx HIV, renal transplant, p/w SOB x several weeks, worse since yesterday, + productive cough, no fevers, no CP.  Diffuse rhonchi on exam, mild tachypnea, O2 sat to low 90s when speaking.  Afebrile.  CXR without focal consolidation though appears to have small b/l effusions with ?interstitial process.  Will dry CT chest, check labs, supplemental O2, RVP, likely admit for further management, renal transplant consult.

## 2019-04-19 NOTE — H&P ADULT - PROBLEM SELECTOR PLAN 3
-Tranplant in [place] in 2015  -Lost to follow up after 2 years due to Travel from [facility] [place]  -Noncompliant with certain medications  -Tacrolimus levels wnl  -Follow up with Transplant consult  -Monitor renal function -Chronic Uncontrolled   -Noncompliant with medications  -Continue Metoprolol  -May need to start pt on additional medications  -Monitor vitals

## 2019-04-19 NOTE — H&P ADULT - NSHPSOCIALHISTORY_GEN_ALL_CORE
Lives at home with 18year old daughter  Works nightshift for homeless shelter  Former smoker; quit 3 months ago. Smoking x 35yrs  Denies EtOH, illicit drug use

## 2019-04-19 NOTE — ED PROVIDER NOTE - PROGRESS NOTE DETAILS
Resident: Elias Yates - 2nd page placed to transplant medicine, awaiting callback. Resident: Elias Yates - Left message for Pts nephrologist Dr. Nascimento, awaiting callback. Resident: Elias Yates - Spoke with Dr. Nasciemnto who stated that pt has very poor follow up. Dr. Nascimento says that he also recently spoke with the transplant team at [place] who say that they have also not seen the patient in a very long time. No one is sure where pt is currently getting his medications from. They would like pt admitted to Dr. Be and they said that I do not need to continue trying to get in touch with our medicine transplant team. Awaiting read on CT given what appears like structural lung abnormality then will call for admission. Resident: Elias Yates - Admitted pt to Dr. Be, made him aware of eosinophilia and CT chest read.

## 2019-04-19 NOTE — CONSULT NOTE ADULT - CONSULT REASON
SOB     hx  HIV, ESRD s/p Right kidney transplant, Moderate AS, MVR s/p MV Repair (Bovine), HTN, Cerebral Aneurysm (without rupture), Endocarditis, Bradycardia s/p PPM, Coumadin in the past for PAF, Anemia,
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
## 2019-04-19 NOTE — CONSULT NOTE ADULT - PROBLEM SELECTOR RECOMMENDATION 9
Pt. with DDRT in 2015. Continue tacrolimus 4 mg PO BID. Last tacrolimus level 3.3 on 4/19/19. Pt. admitted with PNA, consider ID consult. On lab reviewed of [facility] SCr elevated at 2.56 on 10/29/15, stable at 2.58 on 7/12/18 and 2.45 on 8/1/18. SCr stable at 2.21 on admission today (4/19/19). Monitor labs and urine output. Avoid any potential nephrotoxins. Dose medications as per eGFR. Pt. with DDRT in 2015. Continue tacrolimus 4 mg PO BID. Last tacrolimus level 3.3 on 4/19/19. Pt. admitted with PNA. Please send sputum culture, blood culture, aspergillus galactose, fungitel, LDH, cryptococcus PRC, urine histoplasma, urine legionella, PCP PCR sputum and ID consult. Would start IV antibiotic. On lab reviewed of [facility] SCr elevated at 2.56 on 10/29/15, stable at 2.58 on 7/12/18 and 2.45 on 8/1/18. SCr stable at 2.21 on admission today (4/19/19). Monitor labs and urine output. Avoid any potential nephrotoxins. Dose medications as per eGFR.

## 2019-04-19 NOTE — ED ADULT NURSE NOTE - OBJECTIVE STATEMENT
53 year old male presents ambulatory to ED through waiting room to [facility] exam room 3 complaining of shortness of breath & productive cough. History of HIV, R renal transplant (4/2015), Previously ESRD on hemodialysis via graft in LLE, removed AV fistula in RUE (states we can use extremity for IV/blood draw & BP), PPM, endocarditis, HTN. States he has had shortness of breath & SHEPPARD x 1 week with productive cough, was getting better now worse again. +yellow sputum. Felt he had fluid on his lungs and took a lasix he had a previous prescription for & is no longer on. Went to outside hospital recently and they gave him breathing treatments with little outpatient instructions. Denies fevers or chills. Denies ha, chest pain, abdominal pain, NVD, dysuria, hematuria, change in urine output.

## 2019-04-19 NOTE — ED PROVIDER NOTE - PMH
Anemia  s/p transfusion 1 week ago 2013  Cerebral aneurysm without rupture    Endocarditis  10/ 2014 admitted & treated  ESRD on Dialysis  Tues /thurs/ saterday  GI (gastrointestinal bleed)    HIV (human immunodeficiency virus infection)    HTN - Hypertension    Human Immunodeficiency Virus [HIV] Disease  x 2010  Kidney transplanted    Mitral valve regurgitation    Sinus bradycardia  Pacemaker 10/2013  ST Umair Model 8002

## 2019-04-20 DIAGNOSIS — I16.0 HYPERTENSIVE URGENCY: ICD-10-CM

## 2019-04-20 DIAGNOSIS — J18.9 PNEUMONIA, UNSPECIFIED ORGANISM: ICD-10-CM

## 2019-04-20 DIAGNOSIS — N18.9 CHRONIC KIDNEY DISEASE, UNSPECIFIED: ICD-10-CM

## 2019-04-20 DIAGNOSIS — A41.9 SEPSIS, UNSPECIFIED ORGANISM: ICD-10-CM

## 2019-04-20 DIAGNOSIS — N17.9 ACUTE KIDNEY FAILURE, UNSPECIFIED: ICD-10-CM

## 2019-04-20 LAB
ANION GAP SERPL CALC-SCNC: 17 MMOL/L — SIGNIFICANT CHANGE UP (ref 5–17)
BASOPHILS # BLD AUTO: 0.01 K/UL — SIGNIFICANT CHANGE UP (ref 0–0.2)
BASOPHILS NFR BLD AUTO: 0.2 % — SIGNIFICANT CHANGE UP (ref 0–2)
BUN SERPL-MCNC: 37 MG/DL — HIGH (ref 7–23)
CALCIUM SERPL-MCNC: 11.4 MG/DL — HIGH (ref 8.4–10.5)
CHLORIDE SERPL-SCNC: 99 MMOL/L — SIGNIFICANT CHANGE UP (ref 96–108)
CO2 SERPL-SCNC: 19 MMOL/L — LOW (ref 22–31)
CREAT SERPL-MCNC: 2.36 MG/DL — HIGH (ref 0.5–1.3)
CRYPTOC AG FLD QL: NEGATIVE — SIGNIFICANT CHANGE UP
EOSINOPHIL # BLD AUTO: 0.16 K/UL — SIGNIFICANT CHANGE UP (ref 0–0.5)
EOSINOPHIL NFR BLD AUTO: 2.6 % — SIGNIFICANT CHANGE UP (ref 0–6)
GLUCOSE SERPL-MCNC: 169 MG/DL — HIGH (ref 70–99)
GRAM STN FLD: SIGNIFICANT CHANGE UP
HCT VFR BLD CALC: 42.8 % — SIGNIFICANT CHANGE UP (ref 39–50)
HGB BLD-MCNC: 13.7 G/DL — SIGNIFICANT CHANGE UP (ref 13–17)
IMM GRANULOCYTES NFR BLD AUTO: 0.2 % — SIGNIFICANT CHANGE UP (ref 0–1.5)
LEGIONELLA AG UR QL: NEGATIVE — SIGNIFICANT CHANGE UP
LYMPHOCYTES # BLD AUTO: 0.94 K/UL — LOW (ref 1–3.3)
LYMPHOCYTES # BLD AUTO: 15.3 % — SIGNIFICANT CHANGE UP (ref 13–44)
MCHC RBC-ENTMCNC: 30 PG — SIGNIFICANT CHANGE UP (ref 27–34)
MCHC RBC-ENTMCNC: 32 GM/DL — SIGNIFICANT CHANGE UP (ref 32–36)
MCV RBC AUTO: 93.7 FL — SIGNIFICANT CHANGE UP (ref 80–100)
MONOCYTES # BLD AUTO: 0.18 K/UL — SIGNIFICANT CHANGE UP (ref 0–0.9)
MONOCYTES NFR BLD AUTO: 2.9 % — SIGNIFICANT CHANGE UP (ref 2–14)
NEUTROPHILS # BLD AUTO: 4.84 K/UL — SIGNIFICANT CHANGE UP (ref 1.8–7.4)
NEUTROPHILS NFR BLD AUTO: 78.8 % — HIGH (ref 43–77)
PLATELET # BLD AUTO: 167 K/UL — SIGNIFICANT CHANGE UP (ref 150–400)
POTASSIUM SERPL-MCNC: 5.3 MMOL/L — SIGNIFICANT CHANGE UP (ref 3.5–5.3)
POTASSIUM SERPL-SCNC: 5.3 MMOL/L — SIGNIFICANT CHANGE UP (ref 3.5–5.3)
RBC # BLD: 4.57 M/UL — SIGNIFICANT CHANGE UP (ref 4.2–5.8)
RBC # FLD: 14.8 % — HIGH (ref 10.3–14.5)
SODIUM SERPL-SCNC: 135 MMOL/L — SIGNIFICANT CHANGE UP (ref 135–145)
SPECIMEN SOURCE: SIGNIFICANT CHANGE UP
TACROLIMUS SERPL-MCNC: 6.3 NG/ML — SIGNIFICANT CHANGE UP
WBC # BLD: 6.14 K/UL — SIGNIFICANT CHANGE UP (ref 3.8–10.5)
WBC # FLD AUTO: 6.14 K/UL — SIGNIFICANT CHANGE UP (ref 3.8–10.5)

## 2019-04-20 PROCEDURE — 99223 1ST HOSP IP/OBS HIGH 75: CPT

## 2019-04-20 PROCEDURE — 99232 SBSQ HOSP IP/OBS MODERATE 35: CPT | Mod: GC

## 2019-04-20 RX ORDER — FUROSEMIDE 40 MG
20 TABLET ORAL DAILY
Qty: 0 | Refills: 0 | Status: DISCONTINUED | OUTPATIENT
Start: 2019-04-21 | End: 2019-04-21

## 2019-04-20 RX ADMIN — Medication 0.5 MILLIGRAM(S): at 17:15

## 2019-04-20 RX ADMIN — Medication 3 MILLILITER(S): at 10:17

## 2019-04-20 RX ADMIN — Medication 600 MILLIGRAM(S): at 17:14

## 2019-04-20 RX ADMIN — Medication 25 MILLIGRAM(S): at 13:13

## 2019-04-20 RX ADMIN — Medication 25 MILLIGRAM(S): at 06:18

## 2019-04-20 RX ADMIN — CEFTRIAXONE 100 GRAM(S): 500 INJECTION, POWDER, FOR SOLUTION INTRAMUSCULAR; INTRAVENOUS at 17:14

## 2019-04-20 RX ADMIN — Medication 0.5 MILLIGRAM(S): at 06:17

## 2019-04-20 RX ADMIN — Medication 3 MILLILITER(S): at 17:15

## 2019-04-20 RX ADMIN — ATOVAQUONE 750 MILLIGRAM(S): 750 SUSPENSION ORAL at 18:19

## 2019-04-20 RX ADMIN — ABACAVIR 600 MILLIGRAM(S): 20 SOLUTION ORAL at 13:13

## 2019-04-20 RX ADMIN — TACROLIMUS 4 MILLIGRAM(S): 5 CAPSULE ORAL at 17:14

## 2019-04-20 RX ADMIN — Medication 600 MILLIGRAM(S): at 06:17

## 2019-04-20 RX ADMIN — Medication 150 MILLIGRAM(S): at 13:14

## 2019-04-20 RX ADMIN — DOLUTEGRAVIR SODIUM 50 MILLIGRAM(S): 25 TABLET, FILM COATED ORAL at 13:14

## 2019-04-20 RX ADMIN — Medication 20 MILLIGRAM(S): at 06:18

## 2019-04-20 RX ADMIN — ATOVAQUONE 750 MILLIGRAM(S): 750 SUSPENSION ORAL at 06:17

## 2019-04-20 RX ADMIN — Medication 25 MILLIGRAM(S): at 21:16

## 2019-04-20 RX ADMIN — AZITHROMYCIN 250 MILLIGRAM(S): 500 TABLET, FILM COATED ORAL at 18:20

## 2019-04-20 RX ADMIN — Medication 3 MILLILITER(S): at 02:56

## 2019-04-20 RX ADMIN — Medication 3 MILLILITER(S): at 21:16

## 2019-04-20 RX ADMIN — TACROLIMUS 4 MILLIGRAM(S): 5 CAPSULE ORAL at 06:17

## 2019-04-20 NOTE — CONSULT NOTE ADULT - ASSESSMENT
52yo male with hx of HIV, ESRD s/p Right kidney transplant, Moderate AS, MVR s/p MV Repair (Bovine), HTN, Cerebral Aneurysm (without rupture), Endocarditis, Bradycardia s/p PPM, Coumadin in the past for PAF, Anemia, presented to the ED with c/o sob/productive cough x 1 week.

## 2019-04-20 NOTE — PROGRESS NOTE ADULT - ATTENDING COMMENTS
Patient seen and examined.  Agree with above NP note.  iv lasix for acute/chronic muro HF  await echo to eval AS, pros mvr  trend renal function

## 2019-04-20 NOTE — PROGRESS NOTE ADULT - ASSESSMENT
52yo male with hx of HIV, ESRD s/p Right kidney transplant, Moderate AS, MVR s/p MV Repair (Bovine), HTN, Cerebral Aneurysm (without rupture), Endocarditis, Bradycardia s/p PPM, Coumadin in the past for PAF, Anemia, presented to the ED with c/o sob/productive cough x 1 week.      1. SOB   multifactorial 2/2 to COPD, ?infectious etiology, CHF   CT chest questionable PNA vs Bronchiolitis, + emphysema   no obvious signs of infection, RVP neg  cv stable no chest pain or sob.no evidence of acute ischemia/ACS  clinically improving, on lasix 20mg IVP daily, abx ,duonebs  pending echo to eval LV function, valvular disease  will consider ischemic eval pending echo results   Pulm f/u     2. Acute Diastolic CHF exacerbation   pt. non compliant with lasix, bb   does not appear overtly overloaded on exam today. no sob or dyspnea  CT chest as mentioned above  last echo from 2014 normal LV function EF 74%   mildly elevated creat today, change lasix to 20 mg po daily    continue bb   check echo to re-eval LV function     3. Moderate AS, MVR s/p MV Repair   pending echo to re-eval valvular disease   continue diuresis, bb    4. PAF, hx  remains NSR  has been off of AC   continue bb     5. ESRD s/p Right kidney transplant  transplant team f/u     6. HTN  bp stable, continue with current anti-htn meds     dvt ppx

## 2019-04-20 NOTE — PROGRESS NOTE ADULT - PROBLEM SELECTOR PLAN 5
-CT Chest consistent with Emphysema  -Smoking hx - nicotne patch, smoking cessation education provided

## 2019-04-20 NOTE — CONSULT NOTE ADULT - ASSESSMENT
52yo male with hx of HIV, ESRD s/p Right kidney transplant, Moderate AS, MVR s/p MV Repair (Bovine), HTN, Cerebral Aneurysm (without rupture), Endocarditis, Bradycardia s/p PPM, Coumadin in the past for PAF, Anemia, presented to the ED with c/o sob/productive cough x 1 week.  Chest CT with upper lobe predominant ground-glass and nodular opacities.     COPD AE and CHF exacerbation both possible  CT findings consistent with emphysema. No consolidations or infiltrates. GGOs could represent fluid overload and/or infectious process    For COPD AE, recommend   Solumedrol 40mg daily  Agree with empiric ceftriaxone and azithromycin for now  Budesonide BID  Albuterol q4-6h   Check urine legionella Ag  Check sputum cx    For CHF exacerbation recommend  continuation of diuresis  ECHO to also evaluate for endocarditis 54yo male with hx of HIV, ESRD s/p Right kidney transplant, Moderate AS, MVR s/p MV Repair (Bovine), HTN, Cerebral Aneurysm (without rupture), Endocarditis, Bradycardia s/p PPM, Coumadin in the past for PAF, Anemia, presented to the ED with c/o sob/productive cough x 1 week.  Chest CT with upper lobe predominant ground-glass and nodular opacities.     COPD AE and CHF exacerbation both possible  CT findings consistent with emphysema. No consolidations or infiltrates. GGOs could represent fluid overload and/or infectious process    For COPD AE, recommend   Continue Solumedrol 40mg daily -- can transition to oral prednisone 40mg tomorrow if still doing better  Agree with empiric ceftriaxone and azithromycin for now  Budesonide BID  Albuterol q4-6h   Check urine legionella Ag  Check sputum cx    For CHF exacerbation recommend  continuation of diuresis  ECHO to also evaluate for endocarditis

## 2019-04-20 NOTE — CONSULT NOTE ADULT - PROBLEM SELECTOR RECOMMENDATION 2
He has mild copd exacerbation: he has quit smoking: He is on Pulmicort as well as Duoneb: I don't think he needs steroids at this time:

## 2019-04-20 NOTE — CONSULT NOTE ADULT - SUBJECTIVE AND OBJECTIVE BOX
52yo male with hx of HIV, ESRD s/p Right kidney transplant, Moderate AS, MVR s/p MV Repair (Bovine), HTN, Cerebral Aneurysm (without rupture), Endocarditis, Bradycardia s/p PPM, Coumadin in the past for PAF, Anemia, presented to the ED with c/o sob/productive cough x 1 week.  Chest CT with upper lobe predominant ground-glass and nodular opacities. Also with panlobar emphysema with subpleural emphysema.     REVIEW OF SYSTEMS:  Constitutional: [ ] negative [ ] fevers [ ] chills [ ] weight loss [ ] weight gain  HEENT: [ ] negative [ ] dry eyes [ ] eye irritation [ ] postnasal drip [ ] nasal congestion  CV: [ ] negative  [ ] chest pain [ ] orthopnea [ ] palpitations [ ] murmur  Resp: [ ] negative [ ] cough [ ] shortness of breath [ ] dyspnea [ ] wheezing [ ] sputum [ ] hemoptysis  GI: [ ] negative [ ] nausea [ ] vomiting [ ] diarrhea [ ] constipation [ ] abd pain [ ] dysphagia   : [ ] negative [ ] dysuria [ ] nocturia [ ] hematuria [ ] increased urinary frequency  Musculoskeletal: [ ] negative [ ] back pain [ ] myalgias [ ] arthralgias [ ] fracture  Skin: [ ] negative [ ] rash [ ] itch  Neurological: [ ] negative [ ] headache [ ] dizziness [ ] syncope [ ] weakness [ ] numbness  Psychiatric: [ ] negative [ ] anxiety [ ] depression  Endocrine: [ ] negative [ ] diabetes [ ] thyroid problem  Hematologic/Lymphatic: [ ] negative [ ] anemia [ ] bleeding problem  Allergic/Immunologic: [ ] negative [ ] itchy eyes [ ] nasal discharge [ ] hives [ ] angioedema  [ ] All other systems negative  [ ] Unable to assess ROS because ________    OBJECTIVE:  ICU Vital Signs Last 24 Hrs  T(C): 36.5 (2019 05:31), Max: 36.9 (2019 20:56)  T(F): 97.7 (2019 05:31), Max: 98.4 (2019 20:56)  HR: 102 (2019 05:31) (66 - 102)  BP: 120/79 (2019 05:31) (120/79 - 177/87)  BP(mean): --  ABP: --  ABP(mean): --  RR: 22 (2019 05:31) (18 - 25)  SpO2: 93% (2019 05:31) (93% - 100%)         @ 07:01  -  04 @ 07:00  --------------------------------------------------------  IN: 900 mL / OUT: 550 mL / NET: 350 mL      CAPILLARY BLOOD GLUCOSE          PHYSICAL EXAM:  General:   HEENT:   Lymph Nodes:  Neck:   Respiratory:   Cardiovascular:   Abdomen:   Extremities:   Skin:   Neurological:  Psychiatry:    HOSPITAL MEDICATIONS:  Standing Meds:  abacavir 600 milliGRAM(s) Oral daily  ALBUTerol/ipratropium for Nebulization 3 milliLiter(s) Nebulizer every 4 hours  atovaquone Suspension 750 milliGRAM(s) Oral two times a day  atovaquone Suspension      azithromycin  IVPB 500 milliGRAM(s) IV Intermittent every 24 hours  azithromycin  IVPB      buDESOnide   0.5 milliGRAM(s) Respule 0.5 milliGRAM(s) Inhalation two times a day  cefTRIAXone   IVPB 1 Gram(s) IV Intermittent every 24 hours  cefTRIAXone   IVPB      dolutegravir 50 milliGRAM(s) Oral daily  furosemide   Injectable 20 milliGRAM(s) IV Push daily  guaiFENesin  milliGRAM(s) Oral every 12 hours  heparin  Injectable 5000 Unit(s) SubCutaneous every 8 hours  hydrALAZINE 25 milliGRAM(s) Oral every 8 hours  lamiVUDine 150 milliGRAM(s) Oral daily  metoprolol succinate ER 25 milliGRAM(s) Oral daily  tacrolimus 4 milliGRAM(s) Oral every 12 hours      PRN Meds:      LABS:                        13.7   11.6  )-----------( 156      ( 2019 08:09 )             43.2     Hgb Trend: 13.7<--      135  |  99  |  37<H>  ----------------------------<  169<H>  5.3   |  19<L>  |  2.36<H>    Ca    11.4<H>      2019 06:54    TPro  8.5<H>  /  Alb  4.4  /  TBili  0.4  /  DBili  x   /  AST  29  /  ALT  15  /  AlkPhos  87      Creatinine Trend: 2.36<--, 2.21<--  PT/INR - ( 2019 08:09 )   PT: 11.9 sec;   INR: 1.03 ratio         PTT - ( 2019 08:09 )  PTT:33.3 sec  Urinalysis Basic - ( 2019 21:01 )    Color: Light Yellow / Appearance: Clear / S.014 / pH: x  Gluc: x / Ketone: Negative  / Bili: Negative / Urobili: Negative   Blood: x / Protein: Negative / Nitrite: Negative   Leuk Esterase: Negative / RBC: x / WBC x   Sq Epi: x / Non Sq Epi: x / Bacteria: x        Venous Blood Gas:   @ 07:59  7.35/50/32/27/53  VBG Lactate: 1.0      MICROBIOLOGY:     RADIOLOGY:  [x ] Reviewed and interpreted by me 52yo male with hx of HIV, ESRD s/p Right kidney transplant, Moderate AS, MVR s/p MV Repair (Bovine), HTN, Cerebral Aneurysm (without rupture), Endocarditis, Bradycardia s/p PPM, Coumadin in the past for PAF, Anemia, presented to the ED with c/o sob/productive cough x 1 week.  Chest CT with upper lobe predominant ground-glass and nodular opacities. Also with panlobar and subpleural emphysema.     Wheezing has improved significantly in past 12-24 hours. Patient feels cough and SOB are improving as well    REVIEW OF SYSTEMS:  Constitutional: [ ] negative [ ] fevers [ ] chills [ ] weight loss [ ] weight gain x night sweats  HEENT: [ ] negative [ ] dry eyes [ ] eye irritation [ x] postnasal drip [ ] nasal congestion  CV: [x ] negative  [ ] chest pain [ ] orthopnea [ ] palpitations [ ] murmur  Resp: [ ] negative [x ] cough [x ] shortness of breath [ ] dyspnea [x ] wheezing [ ] sputum [ ] hemoptysis  GI: [ ] negative [ ] nausea [ ] vomiting [ ] diarrhea [ ] constipation [ ] abd pain [ ] dysphagia   : [ ] negative [ ] dysuria [ ] nocturia [ ] hematuria [ ] increased urinary frequency  Musculoskeletal: [ ] negative [ ] back pain [ ] myalgias [ ] arthralgias [ ] fracture  Skin: [ ] negative [ ] rash [ ] itch  Neurological: [ ] negative [ ] headache [ ] dizziness [ ] syncope [ ] weakness [ ] numbness  Psychiatric: [ ] negative [ ] anxiety [ ] depression  Endocrine: [ ] negative [ ] diabetes [ ] thyroid problem  Hematologic/Lymphatic: [ ] negative [ ] anemia [ ] bleeding problem  Allergic/Immunologic: [ ] negative [ ] itchy eyes [ ] nasal discharge [ ] hives [ ] angioedema  [x ] All other systems negative  [ ] Unable to assess ROS because ________    OBJECTIVE:  ICU Vital Signs Last 24 Hrs  T(C): 36.5 (2019 05:31), Max: 36.9 (2019 20:56)  T(F): 97.7 (2019 05:31), Max: 98.4 (2019 20:56)  HR: 102 (2019 05:31) (66 - 102)  BP: 120/79 (2019 05:31) (120/79 - 177/87)  BP(mean): --  ABP: --  ABP(mean): --  RR: 22 (2019 05:31) (18 - 25)  SpO2: 93% (2019 05:31) (93% - 100%)         @ 07:01  -  04-20 @ 07:00  --------------------------------------------------------  IN: 900 mL / OUT: 550 mL / NET: 350 mL      CAPILLARY BLOOD GLUCOSE          PHYSICAL EXAM:  General: NAD  HEENT: no erythema  Lymph Nodes: no LAD  Neck: supple  Respiratory: end exp mild wheezing. No resp distress  Cardiovascular: tachycardia  Abdomen: soft  Extremities: no edema  Skin: normal turgor  Neurological: moves all extremites  Psychiatry: normal affect    HOSPITAL MEDICATIONS:  Standing Meds:  abacavir 600 milliGRAM(s) Oral daily  ALBUTerol/ipratropium for Nebulization 3 milliLiter(s) Nebulizer every 4 hours  atovaquone Suspension 750 milliGRAM(s) Oral two times a day  atovaquone Suspension      azithromycin  IVPB 500 milliGRAM(s) IV Intermittent every 24 hours  azithromycin  IVPB      buDESOnide   0.5 milliGRAM(s) Respule 0.5 milliGRAM(s) Inhalation two times a day  cefTRIAXone   IVPB 1 Gram(s) IV Intermittent every 24 hours  cefTRIAXone   IVPB      dolutegravir 50 milliGRAM(s) Oral daily  furosemide   Injectable 20 milliGRAM(s) IV Push daily  guaiFENesin  milliGRAM(s) Oral every 12 hours  heparin  Injectable 5000 Unit(s) SubCutaneous every 8 hours  hydrALAZINE 25 milliGRAM(s) Oral every 8 hours  lamiVUDine 150 milliGRAM(s) Oral daily  metoprolol succinate ER 25 milliGRAM(s) Oral daily  tacrolimus 4 milliGRAM(s) Oral every 12 hours      PRN Meds:      LABS:                        13.7   11.6  )-----------( 156      ( 2019 08:09 )             43.2     Hgb Trend: 13.7<--  20    135  |  99  |  37<H>  ----------------------------<  169<H>  5.3   |  19<L>  |  2.36<H>    Ca    11.4<H>      2019 06:54    TPro  8.5<H>  /  Alb  4.4  /  TBili  0.4  /  DBili  x   /  AST  29  /  ALT  15  /  AlkPhos  87      Creatinine Trend: 2.36<--, 2.21<--  PT/INR - ( 2019 08:09 )   PT: 11.9 sec;   INR: 1.03 ratio         PTT - ( 2019 08:09 )  PTT:33.3 sec  Urinalysis Basic - ( 2019 21:01 )    Color: Light Yellow / Appearance: Clear / S.014 / pH: x  Gluc: x / Ketone: Negative  / Bili: Negative / Urobili: Negative   Blood: x / Protein: Negative / Nitrite: Negative   Leuk Esterase: Negative / RBC: x / WBC x   Sq Epi: x / Non Sq Epi: x / Bacteria: x        Venous Blood Gas:   @ 07:59  7.35/50/32/27/53  VBG Lactate: 1.0      MICROBIOLOGY:     RADIOLOGY:  [x ] Reviewed and interpreted by me

## 2019-04-20 NOTE — CONSULT NOTE ADULT - PROBLEM SELECTOR RECOMMENDATION 9
minimal GGO and nodular opacities in the bilateral upper lobes: on CAP treatment; would cont fo rnow; Doubt PCP as CD4 count  is high as well as he is on pcp prophylaxis:
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
## 2019-04-20 NOTE — PROGRESS NOTE ADULT - PROBLEM SELECTOR PLAN 6
-Tranplant in [place] in 2015  -Lost to follow up after 2 years due to Travel from [facility] to [place]  -Noncompliant with certain medications  -Tacrolimus levels wnl  -Follow up with Transplant for management  -Monitor renal function

## 2019-04-20 NOTE — PROGRESS NOTE ADULT - PROBLEM SELECTOR PLAN 2
Patient was on tacrolimus 4 mg BID as outpatient. Continue with tacrolimus 4mg BID. Pt. states he was supposed to be on prednisone however he has not taken medication for the last 2 years and dapsone prophylaxis but has been non adherent.   Tacro level 6.3 this AM however medication timing was changed yesterday. Follow up repeat level tomorrow. Monitor daily FK levels( check 30 minutes prior to AM dose).

## 2019-04-20 NOTE — PROGRESS NOTE ADULT - ASSESSMENT
52yo male with hx of HIV, ESRD s/p Right kidney transplant, Moderate AS, MVR s/p MV Repair (Bovine), HTN, Cerebral Aneurysm (without rupture), Endocarditis, Bradycardia s/p PPM, Coumadin in the past for PAF, Anemia, presented to the ED with c/o sob/productive cough x 1 week.  Chest CT with upper lobe predominant ground-glass and nodular opacities.     SOB from COPD exacerbation or cardiac wheezing from CHF or both - r/o endocarditis vs valve dysfunction without infection   h/o renal transplant on immunosuppression    RVP negative,, improving with nebulizer therapy and diuresis,    Await TTE for valve assessment  follow up blood cultures    * HIV    check CD4 and HIV viral load    c/w Abacavir, Lamivudine, tivicay    s/p renal transplant- on Mepron  check fungitell    Kamran Smith MD  [phone]  After 5pm/weekends [phone]

## 2019-04-21 LAB
ANION GAP SERPL CALC-SCNC: 11 MMOL/L — SIGNIFICANT CHANGE UP (ref 5–17)
BUN SERPL-MCNC: 50 MG/DL — HIGH (ref 7–23)
CALCIUM SERPL-MCNC: 10.9 MG/DL — HIGH (ref 8.4–10.5)
CHLORIDE SERPL-SCNC: 100 MMOL/L — SIGNIFICANT CHANGE UP (ref 96–108)
CO2 SERPL-SCNC: 21 MMOL/L — LOW (ref 22–31)
CREAT SERPL-MCNC: 2.62 MG/DL — HIGH (ref 0.5–1.3)
GLUCOSE SERPL-MCNC: 96 MG/DL — SIGNIFICANT CHANGE UP (ref 70–99)
HCT VFR BLD CALC: 42.7 % — SIGNIFICANT CHANGE UP (ref 39–50)
HGB BLD-MCNC: 13 G/DL — SIGNIFICANT CHANGE UP (ref 13–17)
MCHC RBC-ENTMCNC: 30.2 PG — SIGNIFICANT CHANGE UP (ref 27–34)
MCHC RBC-ENTMCNC: 30.4 GM/DL — LOW (ref 32–36)
MCV RBC AUTO: 99.3 FL — SIGNIFICANT CHANGE UP (ref 80–100)
PLATELET # BLD AUTO: 159 K/UL — SIGNIFICANT CHANGE UP (ref 150–400)
POTASSIUM SERPL-MCNC: 5.1 MMOL/L — SIGNIFICANT CHANGE UP (ref 3.5–5.3)
POTASSIUM SERPL-SCNC: 5.1 MMOL/L — SIGNIFICANT CHANGE UP (ref 3.5–5.3)
PROCALCITONIN SERPL-MCNC: 0.09 NG/ML — SIGNIFICANT CHANGE UP (ref 0.02–0.1)
RBC # BLD: 4.3 M/UL — SIGNIFICANT CHANGE UP (ref 4.2–5.8)
RBC # FLD: 15.3 % — HIGH (ref 10.3–14.5)
SODIUM SERPL-SCNC: 132 MMOL/L — LOW (ref 135–145)
TACROLIMUS SERPL-MCNC: 9.6 NG/ML — SIGNIFICANT CHANGE UP
WBC # BLD: 8.16 K/UL — SIGNIFICANT CHANGE UP (ref 3.8–10.5)
WBC # FLD AUTO: 8.16 K/UL — SIGNIFICANT CHANGE UP (ref 3.8–10.5)

## 2019-04-21 RX ORDER — ONDANSETRON 8 MG/1
4 TABLET, FILM COATED ORAL EVERY 6 HOURS
Qty: 0 | Refills: 0 | Status: DISCONTINUED | OUTPATIENT
Start: 2019-04-21 | End: 2019-04-23

## 2019-04-21 RX ORDER — TACROLIMUS 5 MG/1
3 CAPSULE ORAL EVERY 12 HOURS
Qty: 0 | Refills: 0 | Status: DISCONTINUED | OUTPATIENT
Start: 2019-04-21 | End: 2019-04-22

## 2019-04-21 RX ADMIN — Medication 600 MILLIGRAM(S): at 06:04

## 2019-04-21 RX ADMIN — Medication 150 MILLIGRAM(S): at 13:22

## 2019-04-21 RX ADMIN — HEPARIN SODIUM 5000 UNIT(S): 5000 INJECTION INTRAVENOUS; SUBCUTANEOUS at 13:23

## 2019-04-21 RX ADMIN — Medication 0.5 MILLIGRAM(S): at 17:58

## 2019-04-21 RX ADMIN — AZITHROMYCIN 250 MILLIGRAM(S): 500 TABLET, FILM COATED ORAL at 17:57

## 2019-04-21 RX ADMIN — DOLUTEGRAVIR SODIUM 50 MILLIGRAM(S): 25 TABLET, FILM COATED ORAL at 13:23

## 2019-04-21 RX ADMIN — Medication 3 MILLILITER(S): at 10:20

## 2019-04-21 RX ADMIN — Medication 25 MILLIGRAM(S): at 06:04

## 2019-04-21 RX ADMIN — Medication 25 MILLIGRAM(S): at 21:50

## 2019-04-21 RX ADMIN — TACROLIMUS 4 MILLIGRAM(S): 5 CAPSULE ORAL at 06:04

## 2019-04-21 RX ADMIN — Medication 3 MILLILITER(S): at 06:06

## 2019-04-21 RX ADMIN — TACROLIMUS 3 MILLIGRAM(S): 5 CAPSULE ORAL at 17:57

## 2019-04-21 RX ADMIN — Medication 600 MILLIGRAM(S): at 17:57

## 2019-04-21 RX ADMIN — Medication 25 MILLIGRAM(S): at 13:22

## 2019-04-21 RX ADMIN — Medication 3 MILLILITER(S): at 13:55

## 2019-04-21 RX ADMIN — CEFTRIAXONE 100 GRAM(S): 500 INJECTION, POWDER, FOR SOLUTION INTRAMUSCULAR; INTRAVENOUS at 17:57

## 2019-04-21 RX ADMIN — ATOVAQUONE 750 MILLIGRAM(S): 750 SUSPENSION ORAL at 06:07

## 2019-04-21 RX ADMIN — ATOVAQUONE 750 MILLIGRAM(S): 750 SUSPENSION ORAL at 17:56

## 2019-04-21 RX ADMIN — Medication 20 MILLIGRAM(S): at 06:04

## 2019-04-21 RX ADMIN — ONDANSETRON 4 MILLIGRAM(S): 8 TABLET, FILM COATED ORAL at 23:46

## 2019-04-21 RX ADMIN — Medication 3 MILLILITER(S): at 17:58

## 2019-04-21 RX ADMIN — Medication 0.5 MILLIGRAM(S): at 06:08

## 2019-04-21 NOTE — PROGRESS NOTE ADULT - ASSESSMENT
52yo male with hx of HIV, ESRD s/p Right kidney transplant, Moderate AS, MVR s/p MV Repair (Bovine), HTN, Cerebral Aneurysm (without rupture), Endocarditis, Bradycardia s/p PPM, Coumadin in the past for PAF, Anemia, presented to the ED with c/o sob/productive cough x 1 week.      1. SOB   multifactorial 2/2 to COPD, ?infectious etiology, CHF   CT chest questionable PNA vs Bronchiolitis, + emphysema   no obvious signs of infection, RVP neg  cv stable no chest pain or sob.no evidence of acute ischemia/ACS  clinically improved w diuretics , abx ,duonebs  pending echo to eval LV function, valvular disease  will consider ischemic eval pending echo results   Pulm f/u     2. Acute Diastolic CHF exacerbation   pt. non compliant with lasix, bb   volume status stable, creast trending up, Hold lasix for now   CT chest as mentioned above  last echo from 2014 normal LV function EF 74%   continue bb   check echo to re-eval LV function     3. Moderate AS, MVR s/p MV Repair   pending echo to re-eval valvular disease   continue diuresis, bb    4. PAF, hx  remains NSR  has been off of AC   continue bb     5. ESRD s/p Right kidney transplant  carlyn, renal f/u  transplant team f/u     6. HTN  elevated bp in am prior to meds, overall stable  continue with current anti-htn meds     dvt ppx 54yo male with hx of HIV, ESRD s/p Right kidney transplant, Moderate AS, MVR s/p MV Repair (Bovine), HTN, Cerebral Aneurysm (without rupture), Endocarditis, Bradycardia s/p PPM, Coumadin in the past for PAF, Anemia, presented to the ED with c/o sob/productive cough x 1 week.      1. SOB   -improved  -multifactorial 2/2 to COPD, ?infectious etiology/PNA, CHF   -CT chest questionable PNA vs Bronchiolitis, + emphysema   -RVP neg  -no chest pain, no evidence of acute ischemia/ACS  -clinically improved w diuretics , abx ,duonebs  -pending echo to eval LV function, valvular disease  -will consider ischemic eval pending echo results   -Pulm f/u     2. Acute Diastolic CHF exacerbation   -stable, improved  -Hold lasix for now, creat rising, appears euvolemic    -last echo from 2014 normal LV function EF 74%   -await echo   -continue bb     3. Moderate AS, MVR s/p MV Repair   -pending echo to re-eval valvular disease   -continue bb    4. PAF, hx  -remains in NSR  -has been off of AC   -continue bb     5. ESRD s/p Right kidney transplant  -carlyn, renal f/u  -transplant team f/u     6. HTN  -elevated bp in am prior to meds, overall stable  -continue with current anti-htn meds     dvt ppx
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
## 2019-04-21 NOTE — CONSULT NOTE ADULT - ASSESSMENT
53y Male with history of ESRD secondary to HIVAN on HD since 8/2001 s/p DDRT 4/13/15 at [facility], presents with dyspnea. Nephrology consulted for elevated Scr.    1) CKD-3: Baseline Scr 2.3-2.4 as per prior office records. Scr mildly increased today in setting of diuresis and hypercalcemia. Would hold further lasix if ok with cardiology. Avoid nephrotoxins. Monitor electrolytes.  2) HTN with CKD: BP controlled. Continue with current medications and low sodium diet. Monitor BP.  3) LE edema: Patient appear dry. Would hold further lasix as above if ok with cardiology. F/U TTE. Monitor UO.  4) Hypercalcemia: Mild and in setting of diuresis secondary to volume depletion? versus iPTH mediated as patient with prior history of ESRD? Check iPTH.

## 2019-04-21 NOTE — PROGRESS NOTE ADULT - ATTENDING COMMENTS
agree with above NP note.  cv stable  cont current tx  will hold lasix today, volume status improved  creat rising   await echo to eval lv fxn, valve fxn  abx per med/pulmo

## 2019-04-21 NOTE — CONSULT NOTE ADULT - ATTENDING COMMENTS
[facility] NEPHROLOGY  Alvarez Nascimento M.D.  Gregory Bennett D.O.  Cheryl Rodriguez M.D.  Kristina Gonzales, MSN, ANP-C    Telephone: [phone]  Facsimile: [phone]    [address] [place] [address]
Patient seen and examined with Dr. Landa  I agree with her history exam and plans as noted above. Patient with a history of HIV on ARVs, ESRD s/p renal transplant 4 yrs ago, hx. of SBE, works overnight shift in a homeless shelter, began develping increasing SOB and wheezing that felt like a 'URI about a week ago, sxs. progressive and prompted admission, Reports sweats but no fevers, dry cough, intermittent compliance with out pt meds., CXR- but CT with upper lobe findings  Exam notable for extensive bilateral wheezinfg and a murmur audible, no peripheral edema    Needs:  Blood cultures x 3-4 sets  Repeat RVP  TTE and likely MAXWELL  sputum if available  urine for legionella ag  serum crypt ag  quantiferon  treat the wheezing with nebulizers  Try to diurese with IV lasix  Renal, Pulmonary, Cardiology to see    After 3-4 sets of blood cultures can start Ceftriaxone and Azithromycin    Kamran Smith MD  [phone]  After 5pm/weekends [phone]
Patient seen and examined.  Agree with above NP note.  patient with increased dyspnea, likely multifactorial   r/o infectious etiology  iv lasix for acute/chronic muro HF  await echo to eval AS, pros mvr  trend renal function
53 male with HIV (controlled) and s/p renal transplant in 2015 here with upper lobe pneumonia.  Works in a shelter  Diffuse wheezing on a recent smoker  WBC increased  Possibly opportunistic infection  Would start broad spec antibiotics (cefepime, azithromycin)  and check LDH, fungitell, serum cryptococcus antigen, urine histoplasma, urine legionella antigen, aspergillus galactomannan, sputum culture, CD4 count and HIV viral load  Would consult ID.    Continue tacrolimus monotherapy.

## 2019-04-22 LAB
ANION GAP SERPL CALC-SCNC: 9 MMOL/L — SIGNIFICANT CHANGE UP (ref 5–17)
BUN SERPL-MCNC: 50 MG/DL — HIGH (ref 7–23)
CALCIUM SERPL-MCNC: 10.2 MG/DL — SIGNIFICANT CHANGE UP (ref 8.4–10.5)
CALCIUM SERPL-MCNC: 10.6 MG/DL — HIGH (ref 8.4–10.5)
CHLORIDE SERPL-SCNC: 101 MMOL/L — SIGNIFICANT CHANGE UP (ref 96–108)
CO2 SERPL-SCNC: 23 MMOL/L — SIGNIFICANT CHANGE UP (ref 22–31)
CREAT SERPL-MCNC: 2.85 MG/DL — HIGH (ref 0.5–1.3)
CULTURE RESULTS: SIGNIFICANT CHANGE UP
FUNGITELL: 40 PG/ML — SIGNIFICANT CHANGE UP
GALACTOMANNAN AG SERPL-ACNC: <0.5 INDEX — SIGNIFICANT CHANGE UP
GLUCOSE SERPL-MCNC: 87 MG/DL — SIGNIFICANT CHANGE UP (ref 70–99)
H CAPSUL AG SPEC-ACNC: SIGNIFICANT CHANGE UP
H CAPSUL AG UR QL IA: SIGNIFICANT CHANGE UP
LEGIONELLA AG UR QL: NEGATIVE — SIGNIFICANT CHANGE UP
POTASSIUM SERPL-MCNC: 5.4 MMOL/L — HIGH (ref 3.5–5.3)
POTASSIUM SERPL-SCNC: 5.4 MMOL/L — HIGH (ref 3.5–5.3)
PTH-INTACT FLD-MCNC: 186 PG/ML — HIGH (ref 15–65)
SODIUM SERPL-SCNC: 133 MMOL/L — LOW (ref 135–145)
SPECIMEN SOURCE: SIGNIFICANT CHANGE UP
TACROLIMUS SERPL-MCNC: 11.3 NG/ML — SIGNIFICANT CHANGE UP

## 2019-04-22 PROCEDURE — 99232 SBSQ HOSP IP/OBS MODERATE 35: CPT | Mod: GC

## 2019-04-22 PROCEDURE — 93306 TTE W/DOPPLER COMPLETE: CPT | Mod: 26

## 2019-04-22 RX ORDER — CINACALCET 30 MG/1
30 TABLET, FILM COATED ORAL DAILY
Qty: 0 | Refills: 0 | Status: DISCONTINUED | OUTPATIENT
Start: 2019-04-22 | End: 2019-04-23

## 2019-04-22 RX ORDER — TACROLIMUS 5 MG/1
2 CAPSULE ORAL EVERY 12 HOURS
Qty: 0 | Refills: 0 | Status: DISCONTINUED | OUTPATIENT
Start: 2019-04-22 | End: 2019-04-23

## 2019-04-22 RX ORDER — SODIUM CHLORIDE 9 MG/ML
1000 INJECTION INTRAMUSCULAR; INTRAVENOUS; SUBCUTANEOUS
Qty: 0 | Refills: 0 | Status: DISCONTINUED | OUTPATIENT
Start: 2019-04-22 | End: 2019-04-23

## 2019-04-22 RX ADMIN — SODIUM CHLORIDE 50 MILLILITER(S): 9 INJECTION INTRAMUSCULAR; INTRAVENOUS; SUBCUTANEOUS at 12:48

## 2019-04-22 RX ADMIN — ATOVAQUONE 750 MILLIGRAM(S): 750 SUSPENSION ORAL at 06:15

## 2019-04-22 RX ADMIN — Medication 0.5 MILLIGRAM(S): at 19:15

## 2019-04-22 RX ADMIN — Medication 3 MILLILITER(S): at 06:15

## 2019-04-22 RX ADMIN — Medication 0.5 MILLIGRAM(S): at 06:15

## 2019-04-22 RX ADMIN — Medication 25 MILLIGRAM(S): at 06:15

## 2019-04-22 RX ADMIN — DOLUTEGRAVIR SODIUM 50 MILLIGRAM(S): 25 TABLET, FILM COATED ORAL at 14:28

## 2019-04-22 RX ADMIN — Medication 25 MILLIGRAM(S): at 14:29

## 2019-04-22 RX ADMIN — Medication 3 MILLILITER(S): at 14:29

## 2019-04-22 RX ADMIN — Medication 3 MILLILITER(S): at 19:14

## 2019-04-22 RX ADMIN — ABACAVIR 600 MILLIGRAM(S): 20 SOLUTION ORAL at 19:26

## 2019-04-22 RX ADMIN — Medication 600 MILLIGRAM(S): at 06:15

## 2019-04-22 RX ADMIN — Medication 25 MILLIGRAM(S): at 21:49

## 2019-04-22 RX ADMIN — Medication 3 MILLILITER(S): at 21:38

## 2019-04-22 RX ADMIN — CINACALCET 30 MILLIGRAM(S): 30 TABLET, FILM COATED ORAL at 19:26

## 2019-04-22 RX ADMIN — TACROLIMUS 3 MILLIGRAM(S): 5 CAPSULE ORAL at 06:16

## 2019-04-22 RX ADMIN — TACROLIMUS 2 MILLIGRAM(S): 5 CAPSULE ORAL at 19:13

## 2019-04-22 RX ADMIN — Medication 150 MILLIGRAM(S): at 14:27

## 2019-04-22 RX ADMIN — Medication 600 MILLIGRAM(S): at 19:13

## 2019-04-22 RX ADMIN — CEFTRIAXONE 100 GRAM(S): 500 INJECTION, POWDER, FOR SOLUTION INTRAMUSCULAR; INTRAVENOUS at 19:13

## 2019-04-22 RX ADMIN — ATOVAQUONE 750 MILLIGRAM(S): 750 SUSPENSION ORAL at 19:14

## 2019-04-22 RX ADMIN — AZITHROMYCIN 250 MILLIGRAM(S): 500 TABLET, FILM COATED ORAL at 21:00

## 2019-04-22 NOTE — PROGRESS NOTE ADULT - PROBLEM SELECTOR PLAN 7
relatively stable, mild  monitor renal function  avoid nephrotoxic rx  nephro management appreciated

## 2019-04-22 NOTE — PROGRESS NOTE ADULT - PROBLEM SELECTOR PLAN 6
-Tranplant in [place] in 2015  -Lost to follow up after 2 years due to Travel from [facility] to [place]  -Noncompliant with certain medications  -Tacrolimus levels wnl  -Follow up with Transplant for management  -Monitor renal function

## 2019-04-22 NOTE — PROGRESS NOTE ADULT - ATTENDING COMMENTS
Agree with above NP note.  cv stable  clinically improved  off diuretics   echo with nl lv function  no significant prosthetic mitral stenosis   AS moderate by gradients/valve area  continue to manage medically  mild carlyn  trend bmp   will defer ischemic eval at present in light of carlyn  patient also has multiple different outpt cardiologists for follow up and in the past has refused any invasive w/u for valve treatment

## 2019-04-22 NOTE — PROGRESS NOTE ADULT - PROBLEM SELECTOR PLAN 2
Patient was on tacrolimus 4 mg BID as outpatient. Continue with tacrolimus 3 mg BID. Pt. states he was supposed to be on prednisone however he has not taken medication for the last 2 years and dapsone prophylaxis but has been non adherent.   Tacro level 11.9 this AM however medication timing was changed yesterday. Follow up repeat level tomorrow. Monitor daily FK levels( check 30 minutes prior to AM dose). Patient was on tacrolimus 4 mg BID as outpatient. Dose decreased to tacrolimus 3 mg BID yesterday.  Tacro level 11.9 today. Recommend to decrease tacrolimus 2 mg BID today. Pt. states he was supposed to be on prednisone however he has not taken medication for the last 2 years and dapsone prophylaxis but has been non adherent. Monitor daily FK levels( check 30 minutes prior to AM dose).

## 2019-04-22 NOTE — PROGRESS NOTE ADULT - ASSESSMENT
53y Male with history of ESRD secondary to HIVAN on HD since 8/2001 s/p DDRT 4/13/15 at [facility], presents with dyspnea. Nephrology consulted for elevated Scr.    1) JOSE: Scr mildly increased today in setting of diuresis (last dose of lasix on 4/21), elevated FK levels and hypercalcemia. Will give gentle IVF (500 ml). Check UA, urine sodium and urine creatinine. Avoid nephrotoxins.  1) CKD-3: Baseline Scr 2.3-2.4 as per prior office records. Monitor electrolytes.  2) HTN with CKD: BP controlled. Continue with current medications and low sodium diet. Monitor BP.  3) LE edema: Patient appear dry. Holding further lasix. F/U TTE. Monitor UO.  4) Hypercalcemia: Mild and secondary to tertiary HPT that persistent post-transplant given elevated iPTH? Start sensipar 30 mg daily. Monitor serum calcium.  5) Hyperkalemia: Mild and in setting of renal insufficiency and elevated FK levels. IVF as above. Continue with low potassium diet. Monitor serum potassium.

## 2019-04-22 NOTE — PROGRESS NOTE ADULT - ATTENDING COMMENTS
Agree with above. I have personally reviewed patients imaging and laboratory data. Patient seen and examined. He is clinically much improved and denies any shortness of breath at present. He has a history of HIV, ESRD s/p kidney transplant, valvular heart disease, PAF, and prior history of endocarditis. He is a former smoker and has CT evidence of emphysema. His CD4 count is good and he has been on PCP prophylaxis. He has evidence of some mild GGO on CT scan and has improved with treatment for CAP. Would suggest completing antibiotics for CAP as per ID. Patient should be continued on bronchodilator therapy. He does not require systemic steroids at this time. He will need outpatient pulmonary followup for full PFTs.     No pulmonary contraindications to discharge. Agree with above. I have personally reviewed patients imaging and laboratory data. Patient seen and examined. He is clinically much improved and denies any shortness of breath at present. He has a history of HIV, ESRD s/p kidney transplant, valvular heart disease, PAF, and prior history of endocarditis. He is a former smoker and has CT evidence of emphysema. His CD4 count is good and he has been on PCP prophylaxis. He has evidence of some mild GGO on CT scan and has improved with treatment for CAP. Would suggest completing antibiotics for CAP as per ID. Patient should be continued on bronchodilator therapy. He does not require systemic steroids at this time. He will need outpatient pulmonary followup for full PFTs. Patient is awaiting a 2D echo. There is an association between HIV and pulmonary hypertension - if found this can be followed up as an outpatient unless it is severe by echocardiogram.    No pulmonary contraindications to discharge.

## 2019-04-22 NOTE — PROGRESS NOTE ADULT - ATTENDING COMMENTS
Kidney transplant recipient with functioning renal allograft, HIV on HAART, admitted with pneumonia, improving.  Reviewed immunosuppression and allograft function  I was present during and reviewed clinical and lab data as well as assessment and plan as documented by the house staff as noted. Please contact if any additional questions with any change in clinical condition or on availability of any additional information or reports.  Plan:  Follow Tac level and adjust for target 5-8 ng/ml  Echo, cardiology follow up for valvular heart disease.  Will follow

## 2019-04-22 NOTE — PROGRESS NOTE ADULT - ASSESSMENT
54yo male with hx of HIV, ESRD s/p Right kidney transplant, Moderate AS, MVR s/p MV Repair (Bovine), HTN, Cerebral Aneurysm (without rupture), Endocarditis, Bradycardia s/p PPM, Coumadin in the past for PAF, Anemia, presented to the ED with c/o sob/productive cough x 1 week.      1. SOB   -improved  -multifactorial 2/2 to COPD, ?infectious etiology/PNA, CHF   -CT chest questionable PNA vs Bronchiolitis, + emphysema   -RVP neg  -no chest pain, no evidence of acute ischemia/ACS  -clinically improved w diuretics , abx ,duonebs  -pending echo to eval LV function, valvular disease  -will consider ischemic eval pending echo results   -Pulm f/u , ID f/u - on iv abx    2. Acute Diastolic CHF exacerbation   -stable, improved  -Hold lasix for now, creat rising, appears euvolemic    -last echo from 2014 normal LV function EF 74%   -await echo   -continue bb     3. Moderate AS, MVR s/p MV Repair   -pending echo to re-eval valvular disease   -continue bb    4. PAF, hx  -remains in NSR  -has been off of AC   -continue bb     5. ESRD s/p Right kidney transplant  -carlyn, renal f/u  -transplant team f/u     6. HTN  -bp stable  -continue current medication regimen     dvt ppx

## 2019-04-22 NOTE — PROGRESS NOTE ADULT - ASSESSMENT
48M with HIV and CD4 of 506, ESRD s/p KTXP, mod AS, pAF on Coumadin p/w 1 week of SOB/Cough with CT findings of broncholitis in the b/l upper lobes with clinical resolution on Azithro/Ceftriaxone coverage.    - Blood, sputum, Legionella and RVP all negative.  - May have had a bronchiolitis from some unknown pathogen but is clinically improved.  - May switch to Levaquin if not interfering with HAART for 3 more days for 1 week therapy total.  - Patient appears clinically stable for home. He may follow up with pulmonary clinic at [address] [place] [address], [phone] or follow up with his primary care doctor for a follow up CT chest non-contrast in 6 weeks.

## 2019-04-22 NOTE — PROGRESS NOTE ADULT - ATTENDING COMMENTS
[facility] NEPHROLOGY  Alvarez Nascimento M.D.  Gregory Bennett D.O.  Cheryl Rodriguez M.D.  Kristina Gonzales, MSN, ANP-C    Telephone: [phone]  Facsimile: [phone]    [address] [place] [address]

## 2019-04-23 ENCOUNTER — TRANSCRIPTION ENCOUNTER (OUTPATIENT)
Age: 54
End: 2019-04-23

## 2019-04-23 VITALS
HEART RATE: 85 BPM | TEMPERATURE: 98 F | OXYGEN SATURATION: 96 % | DIASTOLIC BLOOD PRESSURE: 56 MMHG | SYSTOLIC BLOOD PRESSURE: 135 MMHG | RESPIRATION RATE: 18 BRPM

## 2019-04-23 LAB
ANION GAP SERPL CALC-SCNC: 11 MMOL/L — SIGNIFICANT CHANGE UP (ref 5–17)
APPEARANCE UR: CLEAR — SIGNIFICANT CHANGE UP
BACTERIA # UR AUTO: NEGATIVE — SIGNIFICANT CHANGE UP
BILIRUB UR-MCNC: NEGATIVE — SIGNIFICANT CHANGE UP
BUN SERPL-MCNC: 51 MG/DL — HIGH (ref 7–23)
CALCIUM SERPL-MCNC: 10.4 MG/DL — SIGNIFICANT CHANGE UP (ref 8.4–10.5)
CHLORIDE SERPL-SCNC: 104 MMOL/L — SIGNIFICANT CHANGE UP (ref 96–108)
CO2 SERPL-SCNC: 21 MMOL/L — LOW (ref 22–31)
COLOR SPEC: SIGNIFICANT CHANGE UP
CREAT ?TM UR-MCNC: 113 MG/DL — SIGNIFICANT CHANGE UP
CREAT SERPL-MCNC: 2.74 MG/DL — HIGH (ref 0.5–1.3)
DIFF PNL FLD: NEGATIVE — SIGNIFICANT CHANGE UP
EPI CELLS # UR: 0 /HPF — SIGNIFICANT CHANGE UP (ref 0–5)
G6PD RBC-CCNC: 17.5 U/G HGB — SIGNIFICANT CHANGE UP (ref 7–20.5)
GLUCOSE SERPL-MCNC: 86 MG/DL — SIGNIFICANT CHANGE UP (ref 70–99)
GLUCOSE UR QL: NEGATIVE — SIGNIFICANT CHANGE UP
HYALINE CASTS # UR AUTO: 0 /LPF — SIGNIFICANT CHANGE UP (ref 0–7)
KETONES UR-MCNC: NEGATIVE — SIGNIFICANT CHANGE UP
LEUKOCYTE ESTERASE UR-ACNC: NEGATIVE — SIGNIFICANT CHANGE UP
NITRITE UR-MCNC: NEGATIVE — SIGNIFICANT CHANGE UP
PH UR: 6 — SIGNIFICANT CHANGE UP (ref 5–8)
POTASSIUM SERPL-MCNC: 5.2 MMOL/L — SIGNIFICANT CHANGE UP (ref 3.5–5.3)
POTASSIUM SERPL-SCNC: 5.2 MMOL/L — SIGNIFICANT CHANGE UP (ref 3.5–5.3)
PROT UR-MCNC: NEGATIVE — SIGNIFICANT CHANGE UP
RBC CASTS # UR COMP ASSIST: 1 /HPF — SIGNIFICANT CHANGE UP (ref 0–4)
SODIUM SERPL-SCNC: 136 MMOL/L — SIGNIFICANT CHANGE UP (ref 135–145)
SODIUM UR-SCNC: 75 MMOL/L — SIGNIFICANT CHANGE UP
SP GR SPEC: 1.02 — SIGNIFICANT CHANGE UP (ref 1.01–1.02)
TACROLIMUS SERPL-MCNC: 9.6 NG/ML — SIGNIFICANT CHANGE UP
UROBILINOGEN FLD QL: SIGNIFICANT CHANGE UP
WBC UR QL: 1 /HPF — SIGNIFICANT CHANGE UP (ref 0–5)

## 2019-04-23 PROCEDURE — 82570 ASSAY OF URINE CREATININE: CPT

## 2019-04-23 PROCEDURE — 82947 ASSAY GLUCOSE BLOOD QUANT: CPT

## 2019-04-23 PROCEDURE — 99232 SBSQ HOSP IP/OBS MODERATE 35: CPT | Mod: GC

## 2019-04-23 PROCEDURE — 83970 ASSAY OF PARATHORMONE: CPT

## 2019-04-23 PROCEDURE — 83615 LACTATE (LD) (LDH) ENZYME: CPT

## 2019-04-23 PROCEDURE — 80197 ASSAY OF TACROLIMUS: CPT

## 2019-04-23 PROCEDURE — 85027 COMPLETE CBC AUTOMATED: CPT

## 2019-04-23 PROCEDURE — 85730 THROMBOPLASTIN TIME PARTIAL: CPT

## 2019-04-23 PROCEDURE — 71250 CT THORAX DX C-: CPT

## 2019-04-23 PROCEDURE — 87040 BLOOD CULTURE FOR BACTERIA: CPT

## 2019-04-23 PROCEDURE — 84484 ASSAY OF TROPONIN QUANT: CPT

## 2019-04-23 PROCEDURE — 93005 ELECTROCARDIOGRAM TRACING: CPT

## 2019-04-23 PROCEDURE — 82330 ASSAY OF CALCIUM: CPT

## 2019-04-23 PROCEDURE — 86403 PARTICLE AGGLUT ANTBDY SCRN: CPT

## 2019-04-23 PROCEDURE — 84132 ASSAY OF SERUM POTASSIUM: CPT

## 2019-04-23 PROCEDURE — 83880 ASSAY OF NATRIURETIC PEPTIDE: CPT

## 2019-04-23 PROCEDURE — 83605 ASSAY OF LACTIC ACID: CPT

## 2019-04-23 PROCEDURE — 99285 EMERGENCY DEPT VISIT HI MDM: CPT | Mod: 25

## 2019-04-23 PROCEDURE — 85610 PROTHROMBIN TIME: CPT

## 2019-04-23 PROCEDURE — 94640 AIRWAY INHALATION TREATMENT: CPT

## 2019-04-23 PROCEDURE — 81003 URINALYSIS AUTO W/O SCOPE: CPT

## 2019-04-23 PROCEDURE — 82435 ASSAY OF BLOOD CHLORIDE: CPT

## 2019-04-23 PROCEDURE — 86480 TB TEST CELL IMMUN MEASURE: CPT

## 2019-04-23 PROCEDURE — 76776 US EXAM K TRANSPL W/DOPPLER: CPT

## 2019-04-23 PROCEDURE — 87486 CHLMYD PNEUM DNA AMP PROBE: CPT

## 2019-04-23 PROCEDURE — 87581 M.PNEUMON DNA AMP PROBE: CPT

## 2019-04-23 PROCEDURE — 87798 DETECT AGENT NOS DNA AMP: CPT

## 2019-04-23 PROCEDURE — 85014 HEMATOCRIT: CPT

## 2019-04-23 PROCEDURE — 93306 TTE W/DOPPLER COMPLETE: CPT

## 2019-04-23 PROCEDURE — 86360 T CELL ABSOLUTE COUNT/RATIO: CPT

## 2019-04-23 PROCEDURE — 87385 HISTOPLASMA CAPSUL AG IA: CPT

## 2019-04-23 PROCEDURE — 84300 ASSAY OF URINE SODIUM: CPT

## 2019-04-23 PROCEDURE — 82310 ASSAY OF CALCIUM: CPT

## 2019-04-23 PROCEDURE — 87449 NOS EACH ORGANISM AG IA: CPT

## 2019-04-23 PROCEDURE — 80048 BASIC METABOLIC PNL TOTAL CA: CPT

## 2019-04-23 PROCEDURE — 84145 PROCALCITONIN (PCT): CPT

## 2019-04-23 PROCEDURE — 81001 URINALYSIS AUTO W/SCOPE: CPT

## 2019-04-23 PROCEDURE — 87633 RESP VIRUS 12-25 TARGETS: CPT

## 2019-04-23 PROCEDURE — 84295 ASSAY OF SERUM SODIUM: CPT

## 2019-04-23 PROCEDURE — 87070 CULTURE OTHR SPECIMN AEROBIC: CPT

## 2019-04-23 PROCEDURE — 71046 X-RAY EXAM CHEST 2 VIEWS: CPT

## 2019-04-23 PROCEDURE — 82955 ASSAY OF G6PD ENZYME: CPT

## 2019-04-23 PROCEDURE — 80053 COMPREHEN METABOLIC PANEL: CPT

## 2019-04-23 PROCEDURE — 82803 BLOOD GASES ANY COMBINATION: CPT

## 2019-04-23 RX ORDER — HYDRALAZINE HCL 50 MG
1 TABLET ORAL
Qty: 90 | Refills: 0
Start: 2019-04-23 | End: 2019-05-22

## 2019-04-23 RX ORDER — CINACALCET 30 MG/1
1 TABLET, FILM COATED ORAL
Qty: 30 | Refills: 0
Start: 2019-04-23 | End: 2019-05-22

## 2019-04-23 RX ORDER — BUDESONIDE, MICRONIZED 100 %
0.5 POWDER (GRAM) MISCELLANEOUS
Qty: 1 | Refills: 0
Start: 2019-04-23 | End: 2019-05-22

## 2019-04-23 RX ORDER — ATOVAQUONE 750 MG/5ML
5 SUSPENSION ORAL
Qty: 60 | Refills: 0 | OUTPATIENT
Start: 2019-04-23 | End: 2019-05-22

## 2019-04-23 RX ORDER — ATOVAQUONE 750 MG/5ML
5 SUSPENSION ORAL
Qty: 300 | Refills: 0
Start: 2019-04-23 | End: 2019-05-22

## 2019-04-23 RX ORDER — TACROLIMUS 5 MG/1
2 CAPSULE ORAL
Qty: 0 | Refills: 0 | DISCHARGE
Start: 2019-04-23

## 2019-04-23 RX ADMIN — CINACALCET 30 MILLIGRAM(S): 30 TABLET, FILM COATED ORAL at 12:09

## 2019-04-23 RX ADMIN — ABACAVIR 600 MILLIGRAM(S): 20 SOLUTION ORAL at 12:10

## 2019-04-23 RX ADMIN — Medication 0.5 MILLIGRAM(S): at 07:07

## 2019-04-23 RX ADMIN — DOLUTEGRAVIR SODIUM 50 MILLIGRAM(S): 25 TABLET, FILM COATED ORAL at 12:09

## 2019-04-23 RX ADMIN — Medication 25 MILLIGRAM(S): at 06:27

## 2019-04-23 RX ADMIN — ATOVAQUONE 750 MILLIGRAM(S): 750 SUSPENSION ORAL at 06:26

## 2019-04-23 RX ADMIN — Medication 3 MILLILITER(S): at 06:24

## 2019-04-23 RX ADMIN — Medication 600 MILLIGRAM(S): at 06:25

## 2019-04-23 RX ADMIN — Medication 150 MILLIGRAM(S): at 12:09

## 2019-04-23 RX ADMIN — TACROLIMUS 2 MILLIGRAM(S): 5 CAPSULE ORAL at 06:25

## 2019-04-23 RX ADMIN — Medication 25 MILLIGRAM(S): at 06:25

## 2019-04-23 NOTE — PROGRESS NOTE ADULT - ATTENDING COMMENTS
[facility] NEPHROLOGY  Alvarez Nascimento M.D.  Gregory Bennett D.O.  Cheryl Rodriguez M.D.  Kristina Gonzales, MSN, ANP-C    Telephone: [phone]  Facsimile: [phone]    [address] [place] [address]
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
## 2019-04-23 NOTE — DISCHARGE NOTE PROVIDER - NSDCCPCAREPLAN_GEN_ALL_CORE_FT
PRINCIPAL DISCHARGE DIAGNOSIS  Diagnosis: Shortness of breath  Assessment and Plan of Treatment: SOB from COPD exacerbation or cardiac wheezing from CHF or both   Diuresis with lasix  No bacterila infection,  CT findings of broncholitis in the b/l upper lobes with clinical resolution on Azithro/Ceftriaxone coverage. antibiotics complete  ECHO done, negative for endocarditis   Rapid viral panel negative  Follow up with pulmonary      SECONDARY DISCHARGE DIAGNOSES  Diagnosis: Hypertensive urgency  Assessment and Plan of Treatment: Now resolved, Continue current medication regimen  Follow up with Nephrologist and primary care physician    Diagnosis: Chronic kidney disease  Assessment and Plan of Treatment: Creatinine downtrending at 2.74 today.   Avoid taking (NSAIDs) - (ex: Ibuprofen, Advil, Celebrex, Naprosyn)  Avoid taking any nephrotoxic agents (can harm kidneys) - Intravenous contrast for diagnostic testing, combination cold medications.  Have all medications adjusted for your renal function by your Health Care Provider.  Blood pressure control is important.  Take all medication as prescribed.      Diagnosis: Immunosuppressed status  Assessment and Plan of Treatment: Continue tacrolimus 2 mg BID  Follow up with nephrologist

## 2019-04-23 NOTE — DISCHARGE NOTE NURSING/CASE MANAGEMENT/SOCIAL WORK - NSDCDPATPORTLINK_GEN_ALL_CORE
You can access the [facility] Patient Portal, offered by [facility], by registering with the following website: [email or web address]

## 2019-04-23 NOTE — DISCHARGE NOTE PROVIDER - HOSPITAL COURSE
52yo male with hx of HIV, ESRD s/p Right kidney transplant, Moderate AS, MVR s/p MV Repair (Bovine), HTN, Cerebral Aneurysm (without rupture), Endocarditis, Bradycardia s/p PPM, Coumadin in the past for PAF, Anemia, presented to the ED with c/o sob/productive cough x 1 week.         Sepsis.  Plan: improved 2/2 bacterial pneumonia of unspecified organism     TTE results appreciated, (-) vegetations    pt is medically cleared for safe discharge on po abx per ID recs with out-pt f/u within 5 days.         Pneumonia.  Plan: improving, as above    abx.         Hypertensive urgency.  Plan: improved    c/w antihypertensive rx    pt educated on rx compliance.         HIV infection, unspecified symptom status.  Plan: -Chronic    -Continue home HAART meds    adjust per consultants.          Pulmonary emphysema, unspecified emphysema type.  Plan: -CT Chest consistent with Emphysema    -Smoking hx - nicotine patch, smoking cessation education provided.         Kidney transplanted. Plan: -Transplant in [place] in 2015    -Lost to follow up after 2 years due to Travel from [facility] beds at the [facility] to [place]    -Noncompliant with certain medications    -Tacrolimus levels wnl    -Follow up with Transplant for management    -Monitor renal function.         JOSE (acute kidney injury).  Plan: relatively stable, mild    monitor renal function    avoid nephrotoxic rx    nephro management appreciated.         Chronic kidney disease.  Plan: as above. 52yo male with hx of HIV, ESRD s/p Right kidney transplant, Moderate AS, MVR s/p MV Repair (Bovine), HTN, Cerebral Aneurysm (without rupture), Endocarditis, Bradycardia s/p PPM, Coumadin in the past for PAF, Anemia, presented to the ED with c/o sob/productive cough x 1 week.         Sepsis.  Plan: improved 2/2 bacterial pneumonia of unspecified organism     TTE results appreciated, (-) vegetations    pt is medically cleared for safe discharge on po abx per ID recs with out-pt f/u within 5 days.         Pneumonia.  Plan: improving, as above    abx.         Hypertensive urgency.  Plan: improved    c/w antihypertensive rx    pt educated on rx compliance.         HIV infection, unspecified symptom status.  Plan: -Chronic    -Continue home HAART meds    adjust per consultants.          Pulmonary emphysema, unspecified emphysema type.  Plan: -CT Chest consistent with Emphysema    -Smoking hx - nicotine patch, smoking cessation education provided.         Kidney transplanted. Plan: -Transplant in [place] in 2015    -Lost to follow up after 2 years due to Travel from [facility] to [place]    -Noncompliant with certain medications    -Tacrolimus levels wnl    -Follow up with Transplant for management    -Monitor renal function.         JOSE (acute kidney injury).  Plan: relatively stable, mild    monitor renal function    avoid nephrotoxic rx    nephro management appreciated.         Chronic kidney disease.  plan: as above.

## 2019-04-23 NOTE — DISCHARGE NOTE PROVIDER - NSDCFUADDINST_GEN_ALL_CORE_FT
He may follow up with pulmonary clinic at [address] [place] [address], [phone] or follow up with his primary care doctor for a follow up CT chest non-contrast in 6 week Follow up with pulmonary clinic at [address] [place] [address], [phone] or follow up with his primary care doctor for a follow up CT chest non-contrast in 6 week  Follow up with infectious disease and Nephrologist in 1-2 weeks of discharge   Call to make all follow up appointments

## 2019-04-23 NOTE — PROGRESS NOTE ADULT - PROBLEM SELECTOR PLAN 2
Patient was on tacrolimus 4 mg BID as outpatient. Dose decreased to tacrolimus 3 mg BID yesterday.  Tacro level 9.3 today. Continue tacrolimus 2 mg BID. Pt. states he was supposed to be on prednisone however he has not taken medication for the last 2 years and dapsone prophylaxis but has been non adherent. Monitor daily FK levels( check 30 minutes prior to AM dose).

## 2019-04-23 NOTE — PROGRESS NOTE ADULT - PROBLEM SELECTOR PLAN 6
-Tranplant in [place] in 2015  -Lost to follow up after 2 years due to Travel from [facility] to [place]  -Noncompliant with certain medications  -Tacrolimus levels wnl  -Follow up with Transplant for management  -Monitor renal function

## 2019-04-23 NOTE — PROGRESS NOTE ADULT - PROBLEM SELECTOR PLAN 1
Pt. with DDRT in 2015. Continue tacrolimus 3 mg PO BID. Last tacrolimus level 11.9 on 4/22/19. Pt. admitted with PNA.   Follow up sputum culture, blood culture, aspergillus galactose, fungitel, LDH, cryptococcus PRC, urine histoplasma, urine legionella, PCP PCR sputum.   On lab reviewed of [facility] SCr elevated at 2.56 on 10/29/15, stable at 2.58 on 7/12/18 and 2.45 on 8/1/18. SCr stable at 2.21 on admission 4/19/19).   Scr remains stable at 2.85 today.   Monitor labs and urine output. Avoid any potential nephrotoxins. Dose medications as per eGFR.
Pt. with DDRT in 2015. Continue tacrolimus 4 mg PO BID. Last tacrolimus level 3.3 on 4/19/19. Pt. admitted with PNA.   Follow up sputum culture, blood culture, aspergillus galactose, fungitel, LDH, cryptococcus PRC, urine histoplasma, urine legionella, PCP PCR sputum.   On lab reviewed of [facility] SCr elevated at 2.56 on 10/29/15, stable at 2.58 on 7/12/18 and 2.45 on 8/1/18. SCr stable at 2.21 on admission 4/19/19).   Scr remains stable at 2.36 today.   Monitor labs and urine output. Avoid any potential nephrotoxins. Dose medications as per eGFR.
improved 2/2 bacterial pneumonia of unspecified organism   ** -> F/u TTE.  If (-) then discharge home on po abx per ID recs.
improved 2/2 bacterial pneumonia of unspecified organism   TTE results appreciated, (-) vegetations  pt is medically cleared for safe discharge on po abx per ID recs with outpt f/u within 5 days
on admission  2/2 bacterial pneumonia of unspecified organism   iv abx, supplemental oxygen prn to maintain spo2 >90%, supportive care  f/u cultures  adjust management per consultants
Pt. with DDRT in 2015. Continue tacrolimus 2 mg PO BID. Last tacrolimus level 9.3 on 4/23/19. Pt. admitted with PNA.   ID workup negative. On lab reviewed of [facility] SCr elevated at 2.56 on 10/29/15, stable at 2.58 on 7/12/18 and 2.45 on 8/1/18. SCr stable at 2.21 on admission 4/19/19). Scr downtrending at 2.74 today.   Monitor labs and urine output. Avoid any potential nephrotoxins. Dose medications as per eGFR.

## 2019-04-23 NOTE — DISCHARGE NOTE PROVIDER - PROVIDER TOKENS
PROVIDER:[TOKEN:[[id number]],FOLLOWUP:[2 weeks]],PROVIDER:[TOKEN:[[id number]]],PROVIDER:[TOKEN:[[id number]]]

## 2019-04-23 NOTE — DISCHARGE NOTE PROVIDER - CARE PROVIDER_API CALL
Ana Paula Smith)  Infectious Disease; Internal Medicine  [address] [place] [address]  Phone: [phone]  Fax: [phone]  Follow Up Time: 2 weeks    Toni Gabriel)  Internal Medicine  [address] [facility] [address]  [place] [address]  Phone: [phone]  Fax: [phone]  Follow Up Time:     Sunil Ramirez)  Critical Care Medicine; Internal Medicine; Pulmonary Disease  [address] [facility] [address] [facility] [address]  [place] [address]  Phone: [phone]  Fax: [phone]  Follow Up Time:

## 2019-04-23 NOTE — PROGRESS NOTE ADULT - ASSESSMENT
53y Male with history of ESRD secondary to HIVAN on HD since 8/2001 s/p DDRT 4/13/15 at [facility], presents with dyspnea. Nephrology consulted for elevated Scr.    1) JOSE: likely due to diuresis (last dose of lasix on 4/21), elevated FK levels and hypercalcemia with Scr improving today with bland UA s/p gentle IVF. Avoid nephrotoxins.  1) CKD-3: Baseline Scr 2.3-2.4 as per prior office records. Monitor electrolytes.  2) HTN with CKD: BP controlled. Continue with current medications and low sodium diet. Monitor BP.  3) LE edema: Patient not volume overloaded. Holding further lasix. TTE with normal LVSF. Monitor UO.  4) Hypercalcemia: Improving and secondary to tertiary HPT that persisted post-transplant given elevated iPTH? Continue with sensipar 30 mg daily. Monitor serum calcium.  5) Hyperkalemia: Improving and in setting of renal insufficiency and elevated FK levels. Continue with low potassium diet. Monitor serum potassium.

## 2019-04-23 NOTE — DISCHARGE NOTE PROVIDER - CARE PROVIDERS DIRECT ADDRESSES
[email or web address]

## 2019-04-23 NOTE — PROGRESS NOTE ADULT - PROBLEM SELECTOR PROBLEM 2
Immunosuppressed status
Immunosuppressed status
Pneumonia
Immunosuppressed status

## 2019-04-23 NOTE — PROGRESS NOTE ADULT - ATTENDING COMMENTS
Kidney transplant recipient with functioning renal allograft, chronically elevated creatinine, now admitted with pneumonia, clinically improved. HIV on HAART, valvular heart disease, has bioprosthetic mitral valve.  Reviewed immunosuppression and allograft function. Had elevated Tac level that has been decreased.  Plan:  On discharge to f/u with ID/HIV team and with transplant team  I was present during and reviewed clinical and lab data as well as assessment and plan as documented by the house staff as noted. Please contact if any additional questions with any change in clinical condition or on availability of any additional information or reports.

## 2019-04-23 NOTE — PROGRESS NOTE ADULT - PROVIDER SPECIALTY LIST ADULT
Cardiology
Infectious Disease
Internal Medicine
Nephrology
Pulmonology
Pulmonology
Transplant Medicine
Transplant Medicine

## 2019-04-25 LAB
CULTURE RESULTS: SIGNIFICANT CHANGE UP
GAMMA INTERFERON BACKGROUND BLD IA-ACNC: 0.02 IU/ML — SIGNIFICANT CHANGE UP
M TB IFN-G BLD-IMP: NEGATIVE — SIGNIFICANT CHANGE UP
M TB IFN-G CD4+ BCKGRND COR BLD-ACNC: -0.01 IU/ML — SIGNIFICANT CHANGE UP
M TB IFN-G CD4+CD8+ BCKGRND COR BLD-ACNC: 0.01 IU/ML — SIGNIFICANT CHANGE UP
QUANT TB PLUS MITOGEN MINUS NIL: 4.87 IU/ML — SIGNIFICANT CHANGE UP
SPECIMEN SOURCE: SIGNIFICANT CHANGE UP

## 2019-04-29 ENCOUNTER — APPOINTMENT (OUTPATIENT)
Dept: PULMONOLOGY | Facility: CLINIC | Age: 54
End: 2019-04-29
Payer: MEDICARE

## 2019-04-29 VITALS
BODY MASS INDEX: 20.54 KG/M2 | DIASTOLIC BLOOD PRESSURE: 79 MMHG | WEIGHT: 155 LBS | RESPIRATION RATE: 16 BRPM | SYSTOLIC BLOOD PRESSURE: 146 MMHG | HEART RATE: 76 BPM | TEMPERATURE: 97 F | HEIGHT: 73 IN

## 2019-04-29 DIAGNOSIS — J43.9 EMPHYSEMA, UNSPECIFIED: ICD-10-CM

## 2019-04-29 DIAGNOSIS — R06.00 DYSPNEA, UNSPECIFIED: ICD-10-CM

## 2019-04-29 PROCEDURE — 99214 OFFICE O/P EST MOD 30 MIN: CPT

## 2019-04-29 RX ORDER — ALBUTEROL SULFATE 90 UG/1
108 (90 BASE) AEROSOL, METERED RESPIRATORY (INHALATION)
Qty: 1 | Refills: 3 | Status: ACTIVE | COMMUNITY
Start: 2019-04-29 | End: 1900-01-01

## 2019-04-30 PROBLEM — R06.00 DYSPNEA: Status: ACTIVE | Noted: 2019-04-30

## 2019-04-30 NOTE — HISTORY OF PRESENT ILLNESS
[FreeTextEntry1] : This is a 48 year old male, former smoker, with HIV (CD4 506), ESRD s/p renal transplant, mod AS s/p AVR, paroxysmal afib on Coumadin recently admitted to [facility] (4/19/19 - 4/23/19) here for a hospital follow up.\par \par He presented with 1 week of SOB/Cough with CT findings of bronchiolitis in bilateral upper lobes. He was given with clinical Azithromycin/Ceftriaxone with significant clinical improvement. His dyspnea is 60% improved, but not at baseline. He is now able to walk 5 - 7 blocks, which he was not able to do around the time of admission. At baseline, he can walk indefinitely. He lives on the 4th floor, and can now go up slowly. Before had to stop to catch breath for over 5 minutes after 1 or 2 landings. He was prescribed budesonide nebulizer but was not given a rescue inhaler. He uses the budesonide nebulizer once per day.

## 2019-04-30 NOTE — ASSESSMENT
[FreeTextEntry1] : This is a 48 year old male, former smoker, emphysema (noted on recent CT) with HIV (CD4 506), ESRD s/p renal transplant, mod AS s/p AVR, paroxysmal afib on Coumadin recently admitted to [facility] (4/19/19 - 4/23/19) for dyspnea in setting of bronchiolitis/pneumonia here for a hospital follow up. Clinically improving though not at baseline. Mild wheezing heard on exam. Prescription for ProAir sent to pharmacy. He will follow up in 6 - 8 weeks, and get PFTs prior.

## 2019-04-30 NOTE — PHYSICAL EXAM
[General Appearance - Well Developed] : well developed [Normal Appearance] : normal appearance [Well Groomed] : well groomed [No Deformities] : no deformities [General Appearance - Well Nourished] : well nourished [General Appearance - In No Acute Distress] : no acute distress [Normal Conjunctiva] : the conjunctiva exhibited no abnormalities [Heart Rate And Rhythm] : heart rate and rhythm were normal [Neck Appearance] : the appearance of the neck was normal [III] : III [Heart Sounds] : normal S1 and S2 [Arterial Pulses Normal] : the arterial pulses were normal [Murmurs] : no murmurs present [Abnormal Walk] : normal gait [Nail Clubbing] : no clubbing of the fingernails [Cyanosis, Localized] : no localized cyanosis [Petechial Hemorrhages (___cm)] : no petechial hemorrhages [Nail Splinter Hemorrhages] : no splinter hemorrhages of the nails [] : no rash [Skin Color & Pigmentation] : normal skin color and pigmentation [Oriented To Time, Place, And Person] : oriented to person, place, and time [Impaired Insight] : insight and judgment were intact [No Focal Deficits] : no focal deficits [Affect] : the affect was normal [Mood] : the mood was normal [FreeTextEntry1] : Mild wheezing diffusely [FreeTextEntry2] : No edema

## 2019-04-30 NOTE — REASON FOR VISIT
[Follow-Up - From Hospitalization] : a hospitalization follow-up [FreeTextEntry2] : Bronchiolitis/pneumonia

## 2019-05-20 ENCOUNTER — RX RENEWAL (OUTPATIENT)
Age: 54
End: 2019-05-20

## 2019-05-25 ENCOUNTER — MOBILE ON CALL (OUTPATIENT)
Age: 54
End: 2019-05-25

## 2019-05-26 ENCOUNTER — EMERGENCY (EMERGENCY)
Facility: HOSPITAL | Age: 54
LOS: 1 days | Discharge: HOME | End: 2019-05-26
Attending: EMERGENCY MEDICINE
Payer: MEDICARE

## 2019-05-26 VITALS
SYSTOLIC BLOOD PRESSURE: 176 MMHG | TEMPERATURE: 98 F | HEART RATE: 98 BPM | OXYGEN SATURATION: 96 % | WEIGHT: 154.98 LBS | HEIGHT: 73 IN | DIASTOLIC BLOOD PRESSURE: 91 MMHG | RESPIRATION RATE: 18 BRPM

## 2019-05-26 VITALS
HEART RATE: 81 BPM | SYSTOLIC BLOOD PRESSURE: 144 MMHG | OXYGEN SATURATION: 97 % | TEMPERATURE: 98 F | RESPIRATION RATE: 18 BRPM | DIASTOLIC BLOOD PRESSURE: 75 MMHG

## 2019-05-26 DIAGNOSIS — Z94.0 KIDNEY TRANSPLANT STATUS: Chronic | ICD-10-CM

## 2019-05-26 LAB
ALBUMIN SERPL ELPH-MCNC: 4.4 G/DL — SIGNIFICANT CHANGE UP (ref 3.3–5)
ALP SERPL-CCNC: 72 U/L — SIGNIFICANT CHANGE UP (ref 40–120)
ALT FLD-CCNC: 16 U/L — SIGNIFICANT CHANGE UP (ref 10–45)
ANION GAP SERPL CALC-SCNC: 12 MMOL/L — SIGNIFICANT CHANGE UP (ref 5–17)
APTT BLD: 34.4 SEC — SIGNIFICANT CHANGE UP (ref 27.5–36.3)
AST SERPL-CCNC: 21 U/L — SIGNIFICANT CHANGE UP (ref 10–40)
BASE EXCESS BLDV CALC-SCNC: 0.3 MMOL/L — SIGNIFICANT CHANGE UP (ref -2–2)
BASOPHILS # BLD AUTO: 0.1 K/UL — SIGNIFICANT CHANGE UP (ref 0–0.2)
BASOPHILS NFR BLD AUTO: 0.5 % — SIGNIFICANT CHANGE UP (ref 0–2)
BILIRUB SERPL-MCNC: 0.4 MG/DL — SIGNIFICANT CHANGE UP (ref 0.2–1.2)
BUN SERPL-MCNC: 27 MG/DL — HIGH (ref 7–23)
CA-I SERPL-SCNC: 1.39 MMOL/L — HIGH (ref 1.12–1.3)
CALCIUM SERPL-MCNC: 10.8 MG/DL — HIGH (ref 8.4–10.5)
CHLORIDE BLDV-SCNC: 112 MMOL/L — HIGH (ref 96–108)
CHLORIDE SERPL-SCNC: 107 MMOL/L — SIGNIFICANT CHANGE UP (ref 96–108)
CO2 BLDV-SCNC: 28 MMOL/L — SIGNIFICANT CHANGE UP (ref 22–30)
CO2 SERPL-SCNC: 22 MMOL/L — SIGNIFICANT CHANGE UP (ref 22–31)
CREAT SERPL-MCNC: 2.43 MG/DL — HIGH (ref 0.5–1.3)
EOSINOPHIL # BLD AUTO: 3.2 K/UL — HIGH (ref 0–0.5)
EOSINOPHIL NFR BLD AUTO: 32.5 % — HIGH (ref 0–6)
GAS PNL BLDV: 138 MMOL/L — SIGNIFICANT CHANGE UP (ref 136–145)
GAS PNL BLDV: SIGNIFICANT CHANGE UP
GAS PNL BLDV: SIGNIFICANT CHANGE UP
GLUCOSE BLDV-MCNC: 98 MG/DL — SIGNIFICANT CHANGE UP (ref 70–99)
GLUCOSE SERPL-MCNC: 108 MG/DL — HIGH (ref 70–99)
HCO3 BLDV-SCNC: 26 MMOL/L — SIGNIFICANT CHANGE UP (ref 21–29)
HCT VFR BLD CALC: 37.8 % — LOW (ref 39–50)
HCT VFR BLDA CALC: 37 % — LOW (ref 39–50)
HGB BLD CALC-MCNC: 12.1 G/DL — LOW (ref 13–17)
HGB BLD-MCNC: 12.4 G/DL — LOW (ref 13–17)
HOROWITZ INDEX BLDV+IHG-RTO: SIGNIFICANT CHANGE UP
INR BLD: 1.02 RATIO — SIGNIFICANT CHANGE UP (ref 0.88–1.16)
LACTATE BLDV-MCNC: 1.2 MMOL/L — SIGNIFICANT CHANGE UP (ref 0.7–2)
LYMPHOCYTES # BLD AUTO: 1.5 K/UL — SIGNIFICANT CHANGE UP (ref 1–3.3)
LYMPHOCYTES # BLD AUTO: 15.8 % — SIGNIFICANT CHANGE UP (ref 13–44)
MAGNESIUM SERPL-MCNC: 2.2 MG/DL — SIGNIFICANT CHANGE UP (ref 1.6–2.6)
MCHC RBC-ENTMCNC: 31.8 PG — SIGNIFICANT CHANGE UP (ref 27–34)
MCHC RBC-ENTMCNC: 32.9 GM/DL — SIGNIFICANT CHANGE UP (ref 32–36)
MCV RBC AUTO: 96.8 FL — SIGNIFICANT CHANGE UP (ref 80–100)
MONOCYTES # BLD AUTO: 1 K/UL — HIGH (ref 0–0.9)
MONOCYTES NFR BLD AUTO: 10.6 % — SIGNIFICANT CHANGE UP (ref 2–14)
NEUTROPHILS # BLD AUTO: 4 K/UL — SIGNIFICANT CHANGE UP (ref 1.8–7.4)
NEUTROPHILS NFR BLD AUTO: 40.5 % — LOW (ref 43–77)
NT-PROBNP SERPL-SCNC: 1488 PG/ML — HIGH (ref 0–300)
PCO2 BLDV: 50 MMHG — SIGNIFICANT CHANGE UP (ref 35–50)
PH BLDV: 7.34 — LOW (ref 7.35–7.45)
PLAT MORPH BLD: NORMAL — SIGNIFICANT CHANGE UP
PLATELET # BLD AUTO: 159 K/UL — SIGNIFICANT CHANGE UP (ref 150–400)
PO2 BLDV: 29 MMHG — SIGNIFICANT CHANGE UP (ref 25–45)
POTASSIUM BLDV-SCNC: 4.3 MMOL/L — SIGNIFICANT CHANGE UP (ref 3.5–5.3)
POTASSIUM SERPL-MCNC: 4.6 MMOL/L — SIGNIFICANT CHANGE UP (ref 3.5–5.3)
POTASSIUM SERPL-SCNC: 4.6 MMOL/L — SIGNIFICANT CHANGE UP (ref 3.5–5.3)
PROT SERPL-MCNC: 7.9 G/DL — SIGNIFICANT CHANGE UP (ref 6–8.3)
PROTHROM AB SERPL-ACNC: 11.7 SEC — SIGNIFICANT CHANGE UP (ref 10–12.9)
RBC # BLD: 3.9 M/UL — LOW (ref 4.2–5.8)
RBC # FLD: 14.5 % — SIGNIFICANT CHANGE UP (ref 10.3–14.5)
RBC BLD AUTO: SIGNIFICANT CHANGE UP
SAO2 % BLDV: 48 % — LOW (ref 67–88)
SODIUM SERPL-SCNC: 141 MMOL/L — SIGNIFICANT CHANGE UP (ref 135–145)
TROPONIN T, HIGH SENSITIVITY RESULT: 16 NG/L — SIGNIFICANT CHANGE UP (ref 0–51)
WBC # BLD: 9.7 K/UL — SIGNIFICANT CHANGE UP (ref 3.8–10.5)
WBC # FLD AUTO: 9.7 K/UL — SIGNIFICANT CHANGE UP (ref 3.8–10.5)

## 2019-05-26 PROCEDURE — 96374 THER/PROPH/DIAG INJ IV PUSH: CPT

## 2019-05-26 PROCEDURE — 93308 TTE F-UP OR LMTD: CPT | Mod: 26

## 2019-05-26 PROCEDURE — 84484 ASSAY OF TROPONIN QUANT: CPT

## 2019-05-26 PROCEDURE — 83605 ASSAY OF LACTIC ACID: CPT

## 2019-05-26 PROCEDURE — 82330 ASSAY OF CALCIUM: CPT

## 2019-05-26 PROCEDURE — 93005 ELECTROCARDIOGRAM TRACING: CPT

## 2019-05-26 PROCEDURE — 84295 ASSAY OF SERUM SODIUM: CPT

## 2019-05-26 PROCEDURE — 84132 ASSAY OF SERUM POTASSIUM: CPT

## 2019-05-26 PROCEDURE — 85014 HEMATOCRIT: CPT

## 2019-05-26 PROCEDURE — 93308 TTE F-UP OR LMTD: CPT

## 2019-05-26 PROCEDURE — 85610 PROTHROMBIN TIME: CPT

## 2019-05-26 PROCEDURE — 83735 ASSAY OF MAGNESIUM: CPT

## 2019-05-26 PROCEDURE — 82435 ASSAY OF BLOOD CHLORIDE: CPT

## 2019-05-26 PROCEDURE — 99284 EMERGENCY DEPT VISIT MOD MDM: CPT | Mod: 25

## 2019-05-26 PROCEDURE — 80053 COMPREHEN METABOLIC PANEL: CPT

## 2019-05-26 PROCEDURE — 94640 AIRWAY INHALATION TREATMENT: CPT

## 2019-05-26 PROCEDURE — 76937 US GUIDE VASCULAR ACCESS: CPT | Mod: 26

## 2019-05-26 PROCEDURE — 71046 X-RAY EXAM CHEST 2 VIEWS: CPT

## 2019-05-26 PROCEDURE — 76937 US GUIDE VASCULAR ACCESS: CPT

## 2019-05-26 PROCEDURE — 85027 COMPLETE CBC AUTOMATED: CPT

## 2019-05-26 PROCEDURE — 83880 ASSAY OF NATRIURETIC PEPTIDE: CPT

## 2019-05-26 PROCEDURE — 71046 X-RAY EXAM CHEST 2 VIEWS: CPT | Mod: 26

## 2019-05-26 PROCEDURE — 82803 BLOOD GASES ANY COMBINATION: CPT

## 2019-05-26 PROCEDURE — 85730 THROMBOPLASTIN TIME PARTIAL: CPT

## 2019-05-26 PROCEDURE — 96375 TX/PRO/DX INJ NEW DRUG ADDON: CPT

## 2019-05-26 PROCEDURE — 82947 ASSAY GLUCOSE BLOOD QUANT: CPT

## 2019-05-26 RX ORDER — IPRATROPIUM/ALBUTEROL SULFATE 18-103MCG
3 AEROSOL WITH ADAPTER (GRAM) INHALATION
Qty: 3 | Refills: 0
Start: 2019-05-26 | End: 2019-06-24

## 2019-05-26 RX ORDER — AZITHROMYCIN 500 MG/1
1 TABLET, FILM COATED ORAL
Qty: 4 | Refills: 0
Start: 2019-05-26 | End: 2019-05-29

## 2019-05-26 RX ORDER — IPRATROPIUM/ALBUTEROL SULFATE 18-103MCG
3 AEROSOL WITH ADAPTER (GRAM) INHALATION ONCE
Refills: 0 | Status: COMPLETED | OUTPATIENT
Start: 2019-05-26 | End: 2019-05-26

## 2019-05-26 RX ORDER — AZITHROMYCIN 500 MG/1
500 TABLET, FILM COATED ORAL ONCE
Refills: 0 | Status: COMPLETED | OUTPATIENT
Start: 2019-05-26 | End: 2019-05-26

## 2019-05-26 RX ADMIN — Medication 3 MILLILITER(S): at 16:17

## 2019-05-26 RX ADMIN — AZITHROMYCIN 250 MILLIGRAM(S): 500 TABLET, FILM COATED ORAL at 17:35

## 2019-05-26 RX ADMIN — Medication 125 MILLIGRAM(S): at 16:18

## 2019-05-26 NOTE — ED PROVIDER NOTE - PMH
Anemia  s/p transfusion 1 week ago 2013  Cerebral aneurysm without rupture    Endocarditis  10/ 2014 admitted & treated  ESRD on Dialysis  Tues /thurs/ saterday  GI (gastrointestinal bleed)    HIV (human immunodeficiency virus infection)    HTN - Hypertension    Human Immunodeficiency Virus [HIV] Disease  x 2010  Kidney transplanted    Mitral valve regurgitation    Sinus bradycardia  Pacemaker 10/2013  ST Umair Model 6219 Anemia  s/p transfusion 1 week ago 2013  Cerebral aneurysm without rupture    Endocarditis  10/ 2014 admitted & treated  ESRD on Dialysis  Tues /thurs/ saterday  GI (gastrointestinal bleed)    HIV (human immunodeficiency virus infection)    HTN - Hypertension    Human Immunodeficiency Virus [HIV] Disease  x 2010  Kidney transplanted    Mitral valve regurgitation    Sinus bradycardia  Pacemaker 10/2013  ST Umair Model 4823

## 2019-05-26 NOTE — ED PROVIDER NOTE - NSFOLLOWUPINSTRUCTIONS_ED_ALL_ED_FT
Follow up with your Primary Care Physician within the next 2-3 days  Bring a copy of your test results with you to your appointment  Continue your current medication regimen  Return to the Emergency Room if you experience new or worsening symptoms  continue your medications as prescribed  Take prednisone for the next 4 days  Take Azithromax for the next 4 days  Use nebulizer 4x per day  IF YOU GET WORSE YOU MUST COME BACK TO THE HOSPITAL

## 2019-05-26 NOTE — ED PROVIDER NOTE - CARE PLAN
Principal Discharge DX:	COPD exacerbation Principal Discharge DX:	COPD exacerbation  Assessment and plan of treatment:	Follow up with your Primary Care Physician within the next 2-3 days  Bring a copy of your test results with you to your appointment  Continue your current medication regimen  Return to the Emergency Room if you experience new or worsening symptoms  continue your medications as prescribed  Take prednisone for the next 4 days  Take Azithromax for the next 4 days  Use nebulizer 4x per day  IF YOU GET WORSE YOU MUST COME BACK TO THE HOSPITAL

## 2019-05-26 NOTE — ED PROVIDER NOTE - OBJECTIVE STATEMENT
54yo male with hx of HIV, ESRD s/p Right kidney transplant, Moderate AS, MVR s/p MV Repair (Bovine), HTN, Cerebral Aneurysm (without rupture), Endocarditis, Bradycardia s/p PPM, Coumadin in the past for PAF, Anemia, presented to the ED with c/o 54yo male with hx of HIV, ESRD s/p Right kidney transplant, Moderate AS, MVR s/p MV Repair (Bovine), HTN, Cerebral Aneurysm (without rupture), Endocarditis, Bradycardia s/p PPM, Coumadin in the past for PAF, Anemia, presented to the ED with c/o SOB. patient reports dry cough x 3 days and constant worsening sob, particularly after walking around. was recently started on budesonide and proair outpatient but is getting worse. no fever/chills/chest pain. no n/v. follows with our ID clinic, viral load undetectable. stopped smoking 4 months ago.

## 2019-05-26 NOTE — ED PROVIDER NOTE - CLINICAL SUMMARY MEDICAL DECISION MAKING FREE TEXT BOX
Attending Jaci Mendenhall: 52 y/o male h/o HIV, CHF, renal transplant presenting with sob. pocus shows no evidence of sig b lines making pulmonary edema less likely. concern for possible COPD exacerbation with A line predominant pattern. no fevers, less likely acute PNA. pt with reported cd4 of 500 making PCP less likely. will give duonebs, steroids and cxr, will re-eval

## 2019-05-26 NOTE — ED ADULT NURSE NOTE - OBJECTIVE STATEMENT
52 yo male presents to ED from home c/o SOBx4 days. Patient reports dry cough for 3 days with consistent worsening SOB, after exertion. Patient states he was started on budesonide recently, but feels that his SOB is getting worse. Patient denies CP, nvd, sick contacts, falls/loc. Patient A&ox3, breathing spontaneously, airway patent, bl wheezes to lungs, abdomen nontender, +pulses, cap refill <2 seconds. Patient resting in bed, plan of care explained.
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
## 2019-05-26 NOTE — ED PROVIDER NOTE - PROGRESS NOTE DETAILS
patient significantly improved, wheezing almost completely resolved, requesting discharge with the duonebs we gave him, advised we will call Dr. Indiana Rayo PA-C patient significantly improved, wheezing Significantly improved, requesting discharge with the duonebs we gave him, advised we will call Dr. Indiana Rayo PA-C spoke to Dr. Be, comfortable with patient going home. given incentive spirometer, 325. advised he must  see his MD and return for worsening. patient comfortable with this plan. His nephrologist is also at bedside. spoke to Dr. Be, comfortable with patient going home. given peak flow, 325. advised he must  see his MD and return for worsening. patient comfortable with this plan. His nephrologist is also at bedside.

## 2019-05-26 NOTE — CONSULT NOTE ADULT - ASSESSMENT
53y Male with history of ESRD secondary to HIVAN on HD since 8/2001 s/p DDRT 4/13/15 at [facility], presents with dyspnea. Nephrology consulted for elevated Scr.    1) DDKT- f/u with us or transplant for regular evaluation every 3 months.  Pt has missed appointments in the past.  2)CKD-3: Baseline Scr 2.3-2.4 as per prior office records. Scr at a baseline. Outpatient monitoring is okay if pt to go home.  3) HTN with CKD: BP controlled. Continue with current medications and low sodium diet. Monitor BP.  4) COPD exacerbation- Steroids/Beta-agonists/JENNIFER/Abx. IF pt is to be discharged from ED, must f/u with pulm this week.

## 2019-05-26 NOTE — ED PROVIDER NOTE - CARE PROVIDER_API CALL
Toni Gabriel)  Internal Medicine  [address] [place] [address]  Phone: [phone]  Fax: [phone]  Follow Up Time:

## 2019-05-26 NOTE — ED ADULT NURSE NOTE - PMH
Anemia  s/p transfusion 1 week ago 2013  Cerebral aneurysm without rupture    Endocarditis  10/ 2014 admitted & treated  ESRD on Dialysis  Tues /thurs/ saterday  GI (gastrointestinal bleed)    HIV (human immunodeficiency virus infection)    HTN - Hypertension    Human Immunodeficiency Virus [HIV] Disease  x 2010  Kidney transplanted    Mitral valve regurgitation    Sinus bradycardia  Pacemaker 10/2013  ST Umair Model 6725 Anemia  s/p transfusion 1 week ago 2013  Cerebral aneurysm without rupture    Endocarditis  10/ 2014 admitted & treated  ESRD on Dialysis  Tues /thurs/ saterday  GI (gastrointestinal bleed)    HIV (human immunodeficiency virus infection)    HTN - Hypertension    Human Immunodeficiency Virus [HIV] Disease  x 2010  Kidney transplanted    Mitral valve regurgitation    Sinus bradycardia  Pacemaker 10/2013  ST Umair Model 3685

## 2019-05-26 NOTE — ED ADULT TRIAGE NOTE - CHIEF COMPLAINT QUOTE
diff breathing, Budesonide not working diff breathing, Budesonide not working        Received phone call : Pls admit pt to [facility]

## 2019-05-26 NOTE — CONSULT NOTE ADULT - ATTENDING COMMENTS
[facility] NEPHROLOGY  Alvarez Nascimento M.D.  Gregory Bennett D.O.  Cheryl Rodriguez M.D.  Kristina Gonzales, MSN, ANP-C    Telephone: [phone]  Facsimile: [phone]    [address] [place] [address]

## 2019-05-26 NOTE — ED PROVIDER NOTE - PHYSICAL EXAMINATION
Attending Jaci Mendenhall: Gen: NAD, heent: atrauamtic, eomi, perrla, mmm, op pink, uvula midline, neck; nttp, no nuchal rigidity, chest: nttp, no crepitus, cv: rrr, +murmur, lungs: diffuse wheezing abd: soft, nontender, nondistended, no peritoneal signs, +BS, no guarding, ext: wwp, neg homans, skin: no rash, neuro: awake and alert, following commands, speech clear, sensation and strength intact, no focal deficits

## 2019-06-05 RX ORDER — BUDESONIDE 0.5 MG/2ML
0.5 INHALANT ORAL DAILY
Qty: 2 | Refills: 3 | Status: ACTIVE | COMMUNITY
Start: 2019-05-25 | End: 1900-01-01

## 2019-06-06 ENCOUNTER — OTHER (OUTPATIENT)
Age: 54
End: 2019-06-06

## 2019-06-07 ENCOUNTER — OTHER (OUTPATIENT)
Age: 54
End: 2019-06-07

## 2019-06-13 ENCOUNTER — APPOINTMENT (OUTPATIENT)
Dept: PULMONOLOGY | Facility: CLINIC | Age: 54
End: 2019-06-13

## 2019-06-13 ENCOUNTER — RX RENEWAL (OUTPATIENT)
Age: 54
End: 2019-06-13

## 2019-06-13 RX ORDER — TACROLIMUS 1 MG/1
1 CAPSULE ORAL TWICE DAILY
Qty: 540 | Refills: 3 | Status: ACTIVE | COMMUNITY
Start: 2018-07-12 | End: 1900-01-01

## 2019-06-19 ENCOUNTER — RX RENEWAL (OUTPATIENT)
Age: 54
End: 2019-06-19

## 2019-06-19 RX ORDER — DOLUTEGRAVIR SODIUM 50 MG/1
50 TABLET, FILM COATED ORAL DAILY
Qty: 30 | Refills: 0 | Status: ACTIVE | COMMUNITY
Start: 2018-07-12 | End: 1900-01-01

## 2019-06-19 RX ORDER — ABACAVIR 300 MG/1
300 TABLET ORAL DAILY
Qty: 60 | Refills: 0 | Status: ACTIVE | COMMUNITY
Start: 2018-07-12 | End: 1900-01-01

## 2020-01-01 NOTE — ED PROVIDER NOTE - ATTENDING CONTRIBUTION TO CARE
Statement Selected
Attending MD Jaci Mendenhall:   I personally have seen and examined this patient.  Physician assistant note reviewed and agree on plan of care and except where noted.  See HPI, PE, and MDM for details.

## 2020-04-01 ENCOUNTER — OUTPATIENT (OUTPATIENT)
Dept: OUTPATIENT SERVICES | Facility: HOSPITAL | Age: 55
LOS: 1 days | End: 2020-04-01

## 2020-04-01 DIAGNOSIS — Z94.0 KIDNEY TRANSPLANT STATUS: Chronic | ICD-10-CM

## 2020-04-20 ENCOUNTER — APPOINTMENT (OUTPATIENT)
Dept: NEPHROLOGY | Facility: CLINIC | Age: 55
End: 2020-04-20

## 2020-04-21 ENCOUNTER — INPATIENT (INPATIENT)
Facility: HOSPITAL | Age: 55
LOS: 1 days | Discharge: HOME | DRG: 312 | End: 2020-04-23
Attending: INTERNAL MEDICINE | Admitting: INTERNAL MEDICINE
Payer: MEDICARE

## 2020-04-21 VITALS
SYSTOLIC BLOOD PRESSURE: 203 MMHG | HEART RATE: 104 BPM | DIASTOLIC BLOOD PRESSURE: 112 MMHG | OXYGEN SATURATION: 97 % | WEIGHT: 162.92 LBS | TEMPERATURE: 98 F | RESPIRATION RATE: 20 BRPM | HEIGHT: 73 IN

## 2020-04-21 DIAGNOSIS — Z94.0 KIDNEY TRANSPLANT STATUS: Chronic | ICD-10-CM

## 2020-04-21 DIAGNOSIS — R55 SYNCOPE AND COLLAPSE: ICD-10-CM

## 2020-04-21 LAB
ALBUMIN SERPL ELPH-MCNC: 3.5 G/DL — SIGNIFICANT CHANGE UP (ref 3.3–5)
ALP SERPL-CCNC: 75 U/L — SIGNIFICANT CHANGE UP (ref 40–120)
ALT FLD-CCNC: 6 U/L — LOW (ref 10–45)
ANION GAP SERPL CALC-SCNC: 24 MMOL/L — HIGH (ref 5–17)
AST SERPL-CCNC: 11 U/L — SIGNIFICANT CHANGE UP (ref 10–40)
BASE EXCESS BLDV CALC-SCNC: 3.7 MMOL/L — HIGH (ref -2–2)
BILIRUB SERPL-MCNC: 0.3 MG/DL — SIGNIFICANT CHANGE UP (ref 0.2–1.2)
BUN SERPL-MCNC: 58 MG/DL — HIGH (ref 7–23)
CA-I SERPL-SCNC: 1.21 MMOL/L — SIGNIFICANT CHANGE UP (ref 1.12–1.3)
CALCIUM SERPL-MCNC: 9.9 MG/DL — SIGNIFICANT CHANGE UP (ref 8.4–10.5)
CHLORIDE BLDV-SCNC: 93 MMOL/L — LOW (ref 96–108)
CHLORIDE SERPL-SCNC: 92 MMOL/L — LOW (ref 96–108)
CO2 BLDV-SCNC: 31 MMOL/L — HIGH (ref 22–30)
CO2 SERPL-SCNC: 20 MMOL/L — LOW (ref 22–31)
CREAT SERPL-MCNC: 9.61 MG/DL — HIGH (ref 0.5–1.3)
GAS PNL BLDV: 135 MMOL/L — SIGNIFICANT CHANGE UP (ref 135–145)
GAS PNL BLDV: SIGNIFICANT CHANGE UP
GAS PNL BLDV: SIGNIFICANT CHANGE UP
GLUCOSE BLDV-MCNC: 78 MG/DL — SIGNIFICANT CHANGE UP (ref 70–99)
GLUCOSE SERPL-MCNC: 80 MG/DL — SIGNIFICANT CHANGE UP (ref 70–99)
HCO3 BLDV-SCNC: 29 MMOL/L — SIGNIFICANT CHANGE UP (ref 21–29)
HCT VFR BLD CALC: 24 % — LOW (ref 39–50)
HCT VFR BLDA CALC: 23 % — LOW (ref 39–50)
HGB BLD CALC-MCNC: 7.4 G/DL — LOW (ref 13–17)
HGB BLD-MCNC: 7.2 G/DL — LOW (ref 13–17)
LACTATE BLDV-MCNC: 1.3 MMOL/L — SIGNIFICANT CHANGE UP (ref 0.7–2)
MAGNESIUM SERPL-MCNC: 2.5 MG/DL — SIGNIFICANT CHANGE UP (ref 1.6–2.6)
MCHC RBC-ENTMCNC: 27.5 PG — SIGNIFICANT CHANGE UP (ref 27–34)
MCHC RBC-ENTMCNC: 30 GM/DL — LOW (ref 32–36)
MCV RBC AUTO: 91.6 FL — SIGNIFICANT CHANGE UP (ref 80–100)
NRBC # BLD: 0 /100 WBCS — SIGNIFICANT CHANGE UP (ref 0–0)
NT-PROBNP SERPL-SCNC: HIGH PG/ML (ref 0–300)
OB PNL STL: NEGATIVE — SIGNIFICANT CHANGE UP
PCO2 BLDV: 55 MMHG — HIGH (ref 35–50)
PH BLDV: 7.35 — SIGNIFICANT CHANGE UP (ref 7.35–7.45)
PLATELET # BLD AUTO: 230 K/UL — SIGNIFICANT CHANGE UP (ref 150–400)
PO2 BLDV: 28 MMHG — SIGNIFICANT CHANGE UP (ref 25–45)
POTASSIUM BLDV-SCNC: 4.2 MMOL/L — SIGNIFICANT CHANGE UP (ref 3.5–5.3)
POTASSIUM SERPL-MCNC: 4.5 MMOL/L — SIGNIFICANT CHANGE UP (ref 3.5–5.3)
POTASSIUM SERPL-SCNC: 4.5 MMOL/L — SIGNIFICANT CHANGE UP (ref 3.5–5.3)
PROT SERPL-MCNC: 9.1 G/DL — HIGH (ref 6–8.3)
RBC # BLD: 2.62 M/UL — LOW (ref 4.2–5.8)
RBC # FLD: 15.5 % — HIGH (ref 10.3–14.5)
SAO2 % BLDV: 39 % — LOW (ref 67–88)
SARS-COV-2 RNA SPEC QL NAA+PROBE: SIGNIFICANT CHANGE UP
SODIUM SERPL-SCNC: 136 MMOL/L — SIGNIFICANT CHANGE UP (ref 135–145)
TROPONIN T, HIGH SENSITIVITY RESULT: 114 NG/L — HIGH (ref 0–51)
TROPONIN T, HIGH SENSITIVITY RESULT: 121 NG/L — HIGH (ref 0–51)
WBC # BLD: 7.8 K/UL — SIGNIFICANT CHANGE UP (ref 3.8–10.5)
WBC # FLD AUTO: 7.8 K/UL — SIGNIFICANT CHANGE UP (ref 3.8–10.5)

## 2020-04-21 PROCEDURE — 93010 ELECTROCARDIOGRAM REPORT: CPT

## 2020-04-21 PROCEDURE — 99285 EMERGENCY DEPT VISIT HI MDM: CPT | Mod: CS,GC

## 2020-04-21 PROCEDURE — 71250 CT THORAX DX C-: CPT | Mod: 26

## 2020-04-21 PROCEDURE — 71046 X-RAY EXAM CHEST 2 VIEWS: CPT | Mod: 26

## 2020-04-21 RX ORDER — TACROLIMUS 5 MG/1
2 CAPSULE ORAL
Refills: 0 | Status: DISCONTINUED | OUTPATIENT
Start: 2020-04-21 | End: 2020-04-22

## 2020-04-21 RX ORDER — HYDRALAZINE HCL 50 MG
25 TABLET ORAL ONCE
Refills: 0 | Status: COMPLETED | OUTPATIENT
Start: 2020-04-21 | End: 2020-04-21

## 2020-04-21 RX ORDER — LAMIVUDINE 150 MG
150 TABLET ORAL
Refills: 0 | Status: DISCONTINUED | OUTPATIENT
Start: 2020-04-21 | End: 2020-04-23

## 2020-04-21 RX ORDER — METOPROLOL TARTRATE 50 MG
25 TABLET ORAL DAILY
Refills: 0 | Status: DISCONTINUED | OUTPATIENT
Start: 2020-04-21 | End: 2020-04-23

## 2020-04-21 RX ORDER — BUDESONIDE AND FORMOTEROL FUMARATE DIHYDRATE 160; 4.5 UG/1; UG/1
2 AEROSOL RESPIRATORY (INHALATION)
Refills: 0 | Status: DISCONTINUED | OUTPATIENT
Start: 2020-04-21 | End: 2020-04-23

## 2020-04-21 RX ORDER — ABACAVIR 20 MG/ML
300 SOLUTION ORAL
Refills: 0 | Status: DISCONTINUED | OUTPATIENT
Start: 2020-04-21 | End: 2020-04-21

## 2020-04-21 RX ORDER — ATOVAQUONE/PROGUANIL HCL 250-100 MG
750 TABLET ORAL DAILY
Refills: 0 | Status: DISCONTINUED | OUTPATIENT
Start: 2020-04-21 | End: 2020-04-23

## 2020-04-21 RX ORDER — ABACAVIR 20 MG/ML
600 SOLUTION ORAL DAILY
Refills: 0 | Status: DISCONTINUED | OUTPATIENT
Start: 2020-04-21 | End: 2020-04-23

## 2020-04-21 RX ORDER — HYDRALAZINE HCL 50 MG
25 TABLET ORAL THREE TIMES A DAY
Refills: 0 | Status: DISCONTINUED | OUTPATIENT
Start: 2020-04-21 | End: 2020-04-22

## 2020-04-21 RX ORDER — DAPSONE 100 MG/1
1 TABLET ORAL
Qty: 0 | Refills: 0 | DISCHARGE

## 2020-04-21 RX ORDER — DOLUTEGRAVIR SODIUM 25 MG/1
50 TABLET, FILM COATED ORAL DAILY
Refills: 0 | Status: DISCONTINUED | OUTPATIENT
Start: 2020-04-21 | End: 2020-04-23

## 2020-04-21 RX ADMIN — BUDESONIDE AND FORMOTEROL FUMARATE DIHYDRATE 2 PUFF(S): 160; 4.5 AEROSOL RESPIRATORY (INHALATION) at 23:09

## 2020-04-21 RX ADMIN — Medication 25 MILLIGRAM(S): at 23:11

## 2020-04-21 RX ADMIN — TACROLIMUS 2 MILLIGRAM(S): 5 CAPSULE ORAL at 23:10

## 2020-04-21 RX ADMIN — Medication 25 MILLIGRAM(S): at 12:16

## 2020-04-21 NOTE — ED ADULT NURSE NOTE - PMH
Anemia  s/p transfusion 1 week ago 2013  Cerebral aneurysm without rupture    Endocarditis  10/ 2014 admitted & treated  ESRD on Dialysis  Tues /thurs/ saterday  GI (gastrointestinal bleed)    HIV (human immunodeficiency virus infection)    HTN - Hypertension    Human Immunodeficiency Virus [HIV] Disease  x 2010  Kidney transplanted    Mitral valve regurgitation    Sinus bradycardia  Pacemaker 10/2013  [employer]mair Model 0285

## 2020-04-21 NOTE — ED PROVIDER NOTE - GASTROINTESTINAL, MLM
Abdomen soft, non-tender, no guarding. Abdomen soft, non-tender, no guarding, multiple prior surgical scars

## 2020-04-21 NOTE — ED PROVIDER NOTE - ATTENDING CONTRIBUTION TO CARE
Private Physician [facility] PCP/Far [facility]  54y male pmh Anemia, Cerebral aneurysm, Endocarditis, ESRD HD (M/W/F) last yesterday. GI bleed, HIV, HTN, Sp Failed Renal Transplant, MVR, PPM, Aortic Stenosis, S/P MVR. Pt comes to ed C/O dizzy, lightheaded with minimal exertion past few weeks. If I try to move a chair and I have to sit down with near syncope. No actual syncope. No fever chills, cough, shortness of breath, nvdc, abd pain,cp, Pt was in [facility] 10/2019 and was evaulated for TAVR and told he needed it but couldn't preform. Pt was being eval for same as opt by pmd. Adult male thin nad, NCAT neck supple JVD to angle of jaw. Chest clear anterior & posterior CV 2/6 anita aortic area, +old sternal surg scars, Abd soft +bs no mass guarding, Neuro gcs 15 speech fluent power 5/5 all extr pain light touch intact, RT thigh at permacath in place no dc/tenderness.   Jama Cohen MD, Facep

## 2020-04-21 NOTE — ED PROVIDER NOTE - PROGRESS NOTE DETAILS
Spoke to EP, who states that pt does not meet guidelines to have pacemaker interrogated. Will admit for further evaluation. DH: Left  for Dr. Be ([phone]) for admission. Awaiting call back. DH: Dr. Tiffani rai for medicine unattached admission. DH: Admitted to Dr. Nixon on tele Resident Scribe Statement: I personally performed the service described in the documentation recorded by the scribe in my presence, and it accurately and completely records my words and actions. -Noel Fried, PGY1

## 2020-04-21 NOTE — ED PROVIDER NOTE - OBJECTIVE STATEMENT
Noel Fried (DO): 54y M with PMHx of HTN, Anemia, HIV (undetectable viral load, last checked 1/2020), ESRD S/P right renal transplant (on HD M/W/F, last session yesterday, 4kg removed), Mitral valve regurgitation S/P MVR, Moderate AS, Cerebral aneurysm (w/o rupture), Bradycardia S/P PPM presents to ED c/o dizziness with near syncope for several months. Reports symptoms happen on exertion, including when lifting up something light. Associated with productive cough.  Started Vancomycin 4/17 for purulent discharge from fistula. Pt with uncontrolled HTN, noncompliant with HTN meds Carvedilol and Hydralazine. Denies SOB, CP, syncope, abdominal pain, N/V/D, or F/C. Pt was seen at another hospital in 8/2019 as a TAVR candidate, but was told that it was to be done outpt. Pt has not been evaluated outpt yet for TAVR. Noel Fried (DO): 54y M with PMHx of HTN, Anemia, HIV (undetectable viral load, last checked 1/2020), ESRD S/P right renal transplant (on HD M/W/F, last session yesterday, 4kg removed), Mitral valve regurgitation S/P MVR, Moderate AS, Cerebral aneurysm (w/o rupture), Bradycardia S/P PPM presents to ED c/o dizziness with near syncope for several weeks but never fully lost consciousness. Reports symptoms happen on exertion, including when lifting up something light. Associated with productive cough.  Started Vancomycin 4/17 for purulent discharge from fistula. Pt with uncontrolled HTN, noncompliant with HTN meds Carvedilol and Hydralazine. Denies SOB, CP, syncope, abdominal pain, N/V/D, or F/C. Pt was seen at another hospital in 10/2019 as a TAVR candidate, but was told that it was to be done outpt. Pt has not been evaluated outpt yet for TAVR.

## 2020-04-21 NOTE — ED PROVIDER NOTE - PMH
Anemia  s/p transfusion 1 week ago 2013  Cerebral aneurysm without rupture    Endocarditis  10/ 2014 admitted & treated  ESRD on Dialysis  Tues /thurs/ saterday  GI (gastrointestinal bleed)    HIV (human immunodeficiency virus infection)    HTN - Hypertension    Human Immunodeficiency Virus [HIV] Disease  x 2010  Kidney transplanted    Mitral valve regurgitation    Sinus bradycardia  Pacemaker 10/2013  ST Umair Model 1989

## 2020-04-21 NOTE — H&P ADULT - NSICDXPASTMEDICALHX_GEN_ALL_CORE_FT
PAST MEDICAL HISTORY:  Anemia s/p transfusion 1 week ago 2013    Cerebral aneurysm without rupture     Endocarditis 10/ 2014 admitted & treated    ESRD on Dialysis Tues /thurs/ saterday    GI (gastrointestinal bleed)     HIV (human immunodeficiency virus infection)     HTN - Hypertension     Human Immunodeficiency Virus [HIV] Disease x 2010    Kidney transplanted     Mitral valve regurgitation     Sinus bradycardia Pacemaker 10/2013  ST Umair Model 221

## 2020-04-21 NOTE — ED PROVIDER NOTE - RECTAL
rectal exam: no external hemorrhoids, no tendenress or internal hemorrhoids appreciated. +light brown stool. chaperone: Callie OVALLE)

## 2020-04-21 NOTE — H&P ADULT - ASSESSMENT
54 year old male admitted for syncope. 54 year old male PMH HTN, Anemia, HIV (undetectable viral load, last checked 1/2020), ESRD S/P right renal transplant (on HD M/W/F, last session yesterday, 4kg removed), Mitral valve regurgitation S/P MVR, severe AS, Cerebral aneurysm (w/o rupture), Bradycardia S/P PPM presents to ED c/o dizziness with near syncope for several weeks but never fully lost consciousness. Reports symptoms happen on exertion, including when lifting up something light. No fevers or cough.  Started Vancomycin 4/17 for purulent discharge from fistula. Pt with uncontrolled HTN, noncompliant with HTN meds Carvedilol and Hydralazine. Denies SOB, CP, clear syncope, abdominal pain, N/V/D, or F/C. Pt was seen at another hospital in 10/2019 as a TAVR candidate, but was told that it needed to be arranged as an outpatient. Pt has not been evaluated outpt yet for TAVR. HBG low on arrival at 7.2.      Plan: 54 year old male PMH HTN, Anemia, HIV (undetectable viral load, last checked 1/2020), ESRD S/P right renal transplant (on HD M/W/F, last session yesterday, 4kg removed), Mitral valve regurgitation S/P MVR, severe AS, Cerebral aneurysm (w/o rupture), Bradycardia S/P PPM presents to ED c/o dizziness with near syncope for several weeks but never fully lost consciousness. Reports symptoms happen on exertion, including when lifting up something light. No fevers or cough.  Started Vancomycin 4/17 for purulent discharge from fistula. Pt with uncontrolled HTN, noncompliant with HTN meds Carvedilol and Hydralazine. Denies SOB, CP, clear syncope, abdominal pain, N/V/D, or F/C. Pt was seen at another hospital in 10/2019 as a TAVR candidate, but was told that it needed to be arranged as an outpatient. Pt has not been evaluated outpt yet for TAVR. HBG low on arrival at 7.2.      Plan: Anemia of 7.2 with presyncope. Plan to transfuse 1 unit PRBC with HD on Wednesday. Had HD on Monday. Has perm cath in right leg.   Eval for hemolysis with retic, LDH and haptoglobin in AM. Guiac negative in ER.      ID: Continue all HIV meds, not taking prednisone and not indicated at this time. COVID-19 nasal swab negative in ER, no need for isolation.   Has zero symptoms for COVID, no reason to retest.      CV: Severs AS: Needs TAVR team to follow in AM and arrange for outpatient schedule for TAVR.  Continue all BP meds. 54 year old male PMH HTN, Anemia, HIV (undetectable viral load, last checked 1/2020), ESRD S/P right renal transplant (on HD M/W/F, last session yesterday, 4kg removed), Mitral valve regurgitation S/P MVR, severe AS, Cerebral aneurysm (w/o rupture), Bradycardia S/P PPM presents to ED c/o dizziness with near syncope for several weeks but never fully lost consciousness. Reports symptoms happen on exertion, including when lifting up something light. No fevers or cough.  Started Vancomycin 4/17 for purulent discharge from fistula. Pt with uncontrolled HTN, noncompliant with HTN meds Carvedilol and Hydralazine. Denies SOB, CP, clear syncope, abdominal pain, N/V/D, or F/C. Pt was seen at another hospital in 10/2019 as a TAVR candidate, but was told that it needed to be arranged as an outpatient. Pt has not been evaluated outpt yet for TAVR. HBG low on arrival at 7.2.      Plan: Anemia of 7.2 with presyncope. Plan to transfuse 1 unit PRBC with HD on Wednesday. Had HD on Monday. Has perm cath in right leg.   Eval for hemolysis with retic, LDH and haptoglobin in AM. Guiac negative in ER.  House renal to be called by ER for HD session on Wednesday.     ID: Continue all HIV meds, not taking prednisone and not indicated at this time. COVID-19 nasal swab negative in ER, no need for isolation.   Has zero symptoms for COVID, no reason to retest.      CV: Severs AS: Needs TAVR team to follow in AM and arrange for outpatient schedule for TAVR.  Continue all BP meds.

## 2020-04-21 NOTE — ED ADULT TRIAGE NOTE - CHIEF COMPLAINT QUOTE
dizziness, episodes of near-syncope x months, more frequently intermittent dizziness, episodes of near-syncope x months, more frequently

## 2020-04-21 NOTE — H&P ADULT - HISTORY OF PRESENT ILLNESS
54 year old male admitted for syncope. 54 year old male PMH HTN, Anemia, HIV (undetectable viral load, last checked 1/2020), ESRD S/P right renal transplant (on HD M/W/F, last session yesterday, 4kg removed), Mitral valve regurgitation S/P MVR, severe AS, Cerebral aneurysm (w/o rupture), Bradycardia S/P PPM presents to ED c/o dizziness with near syncope for several weeks but never fully lost consciousness. Reports symptoms happen on exertion, including when lifting up something light. No fevers or cough.  Started Vancomycin 4/17 for purulent discharge from fistula. Pt with uncontrolled HTN, noncompliant with HTN meds Carvedilol and Hydralazine. Denies SOB, CP, clear syncope, abdominal pain, N/V/D, or F/C. Pt was seen at another hospital in 10/2019 as a TAVR candidate, but was told that it needed to be arranged as an outpatient. Pt has not been evaluated outpt yet for TAVR. HBG low on arrival at 7.2.

## 2020-04-21 NOTE — H&P ADULT - NSHPLABSRESULTS_GEN_ALL_CORE
PHYSICAL EXAMINATION:  Vital Signs Last 24 Hrs  T(C): 36.6 (21 Apr 2020 11:07), Max: 36.6 (21 Apr 2020 11:07)  T(F): 97.9 (21 Apr 2020 11:07), Max: 97.9 (21 Apr 2020 11:07)  HR: 93 (21 Apr 2020 11:38) (93 - 104)  BP: 170/84 (21 Apr 2020 14:52) (168/83 - 212/99)  BP(mean): --  RR: 16 (21 Apr 2020 14:52) (16 - 20)  SpO2: 99% (21 Apr 2020 14:52) (97% - 99%)  CAPILLARY BLOOD GLUCOSE          GENERAL: NAD, well-groomed, well-developed  HEAD:  atraumatic, normocephalic  EYES: sclera anicteric  ENMT: mucous membranes moist  NECK: supple, No JVD  CHEST/LUNG: clear to auscultation bilaterally; no rales, rhonchi, or wheezing b/l  HEART: normal S1, S2  ABDOMEN: BS+, soft, ND, NT   EXTREMITIES:  pulses palpable; no clubbing, cyanosis, or edema b/l LEs  NEURO: awake, alert, interactive; moves all extremities  SKIN: no rashes or lesions PHYSICAL EXAMINATION:  Vital Signs Last 24 Hrs  T(C): 36.6 (21 Apr 2020 11:07), Max: 36.6 (21 Apr 2020 11:07)  T(F): 97.9 (21 Apr 2020 11:07), Max: 97.9 (21 Apr 2020 11:07)  HR: 93 (21 Apr 2020 11:38) (93 - 104)  BP: 170/84 (21 Apr 2020 14:52) (168/83 - 212/99)  BP(mean): --  RR: 16 (21 Apr 2020 14:52) (16 - 20)  SpO2: 99% (21 Apr 2020 14:52) (97% - 99%)  CAPILLARY BLOOD GLUCOSE          GENERAL: NAD, comfortable, seen in ER, no CP or SOB at rest  HEAD:  atraumatic, normocephalic  EYES: sclera anicteric  ENMT: mucous membranes moist  NECK: supple, No JVD  CHEST/LUNG: clear to auscultation bilaterally; no rales, rhonchi, or wheezing b/l  HEART: normal S1, S2  ABDOMEN: BS+, soft, ND, NT   EXTREMITIES:  pulses palpable; no clubbing, cyanosis, or edema b/l LEs  NEURO: awake, alert, interactive; moves all extremities  SKIN: no rashes or lesions

## 2020-04-21 NOTE — H&P ADULT - NSHPPHYSICALEXAM_GEN_ALL_CORE
LABS:                        7.2    7.80  )-----------( 230      ( 21 Apr 2020 12:18 )             24.0     04-21    136  |  92<L>  |  58<H>  ----------------------------<  80  4.5   |  20<L>  |  9.61<H>    Ca    9.9      21 Apr 2020 12:18  Mg     2.5     04-21    TPro  9.1<H>  /  Alb  3.5  /  TBili  0.3  /  DBili  x   /  AST  11  /  ALT  6<L>  /  AlkPhos  75  04-21            RADIOLOGY & ADDITIONAL TESTS:

## 2020-04-21 NOTE — ED ADULT NURSE NOTE - INTERVENTIONS DEFINITIONS
Stretcher in lowest position, wheels locked, appropriate side rails in place/Monitor gait and stability/Provide visual cue, wrist band, yellow gown, etc./Non-slip footwear when patient is off stretcher/Physically safe environment: no spills, clutter or unnecessary equipment

## 2020-04-21 NOTE — ED PROVIDER NOTE - NS_ ATTENDINGSCRIBEDETAILS _ED_A_ED_FT
History and Physical performed by me with scribe under my direct supervision.  MACKENZIE Cohen MD FACEP

## 2020-04-21 NOTE — ED PROVIDER NOTE - CLINICAL SUMMARY MEDICAL DECISION MAKING FREE TEXT BOX
Noel Fried (DO): 54y M with PMHx of HTN, Anemia, HIV (1/2020, last viral undetectable), ESRD S/P right renal transplant (on HD M/W/F, last session yesterday, 4kg removed), Mitral valve regurgitation S/P MVR, Moderate AS, Cerebral aneurysm (w/o rupture), Bradycardia S/P PPM p/w near syncope x multiple weeks, progressively worsening since today. VS: hypertensive, afebrile, does not appear infectious. Likely aortic stenosis, arrhythmia, or fluid imbalance. Will obtain labs, EKG, trop, CXR, BP management, and likely admit for further evaluation.

## 2020-04-22 DIAGNOSIS — N18.6 END STAGE RENAL DISEASE: ICD-10-CM

## 2020-04-22 DIAGNOSIS — I35.0 NONRHEUMATIC AORTIC (VALVE) STENOSIS: ICD-10-CM

## 2020-04-22 DIAGNOSIS — I10 ESSENTIAL (PRIMARY) HYPERTENSION: ICD-10-CM

## 2020-04-22 DIAGNOSIS — B20 HUMAN IMMUNODEFICIENCY VIRUS [HIV] DISEASE: ICD-10-CM

## 2020-04-22 LAB
ALBUMIN SERPL ELPH-MCNC: 3.4 G/DL — SIGNIFICANT CHANGE UP (ref 3.3–5)
ALP SERPL-CCNC: 70 U/L — SIGNIFICANT CHANGE UP (ref 40–120)
ALT FLD-CCNC: 7 U/L — LOW (ref 10–45)
ANION GAP SERPL CALC-SCNC: 18 MMOL/L — HIGH (ref 5–17)
AST SERPL-CCNC: 8 U/L — LOW (ref 10–40)
BASOPHILS # BLD AUTO: 0.11 K/UL — SIGNIFICANT CHANGE UP (ref 0–0.2)
BASOPHILS NFR BLD AUTO: 1.7 % — SIGNIFICANT CHANGE UP (ref 0–2)
BILIRUB SERPL-MCNC: 0.2 MG/DL — SIGNIFICANT CHANGE UP (ref 0.2–1.2)
BLD GP AB SCN SERPL QL: NEGATIVE — SIGNIFICANT CHANGE UP
BUN SERPL-MCNC: 68 MG/DL — HIGH (ref 7–23)
CALCIUM SERPL-MCNC: 9.7 MG/DL — SIGNIFICANT CHANGE UP (ref 8.4–10.5)
CHLORIDE SERPL-SCNC: 92 MMOL/L — LOW (ref 96–108)
CO2 SERPL-SCNC: 24 MMOL/L — SIGNIFICANT CHANGE UP (ref 22–31)
CREAT SERPL-MCNC: 11.62 MG/DL — HIGH (ref 0.5–1.3)
EOSINOPHIL # BLD AUTO: 2.14 K/UL — HIGH (ref 0–0.5)
EOSINOPHIL NFR BLD AUTO: 32.2 % — HIGH (ref 0–6)
GLUCOSE SERPL-MCNC: 135 MG/DL — HIGH (ref 70–99)
HAPTOGLOB SERPL-MCNC: 242 MG/DL — HIGH (ref 34–200)
HCT VFR BLD CALC: 25.1 % — LOW (ref 39–50)
HGB BLD-MCNC: 7.5 G/DL — LOW (ref 13–17)
LDH SERPL L TO P-CCNC: 218 U/L — SIGNIFICANT CHANGE UP (ref 50–242)
LYMPHOCYTES # BLD AUTO: 0.77 K/UL — LOW (ref 1–3.3)
LYMPHOCYTES # BLD AUTO: 11.6 % — LOW (ref 13–44)
MCHC RBC-ENTMCNC: 26.9 PG — LOW (ref 27–34)
MCHC RBC-ENTMCNC: 29.9 GM/DL — LOW (ref 32–36)
MCV RBC AUTO: 90 FL — SIGNIFICANT CHANGE UP (ref 80–100)
MONOCYTES # BLD AUTO: 0.49 K/UL — SIGNIFICANT CHANGE UP (ref 0–0.9)
MONOCYTES NFR BLD AUTO: 7.4 % — SIGNIFICANT CHANGE UP (ref 2–14)
NEUTROPHILS # BLD AUTO: 3.13 K/UL — SIGNIFICANT CHANGE UP (ref 1.8–7.4)
NEUTROPHILS NFR BLD AUTO: 47.1 % — SIGNIFICANT CHANGE UP (ref 43–77)
PLATELET # BLD AUTO: 255 K/UL — SIGNIFICANT CHANGE UP (ref 150–400)
POTASSIUM SERPL-MCNC: 4.3 MMOL/L — SIGNIFICANT CHANGE UP (ref 3.5–5.3)
POTASSIUM SERPL-SCNC: 4.3 MMOL/L — SIGNIFICANT CHANGE UP (ref 3.5–5.3)
PROT SERPL-MCNC: 8.8 G/DL — HIGH (ref 6–8.3)
RBC # BLD: 2.77 M/UL — LOW (ref 4.2–5.8)
RBC # BLD: 2.79 M/UL — LOW (ref 4.2–5.8)
RBC # FLD: 15.1 % — HIGH (ref 10.3–14.5)
RETICS #: 59.3 K/UL — SIGNIFICANT CHANGE UP (ref 25–125)
RETICS/RBC NFR: 2.1 % — SIGNIFICANT CHANGE UP (ref 0.5–2.5)
RH IG SCN BLD-IMP: POSITIVE — SIGNIFICANT CHANGE UP
SODIUM SERPL-SCNC: 134 MMOL/L — LOW (ref 135–145)
WBC # BLD: 6.65 K/UL — SIGNIFICANT CHANGE UP (ref 3.8–10.5)
WBC # FLD AUTO: 6.65 K/UL — SIGNIFICANT CHANGE UP (ref 3.8–10.5)

## 2020-04-22 PROCEDURE — 93306 TTE W/DOPPLER COMPLETE: CPT | Mod: 26

## 2020-04-22 PROCEDURE — 99232 SBSQ HOSP IP/OBS MODERATE 35: CPT

## 2020-04-22 PROCEDURE — 93010 ELECTROCARDIOGRAM REPORT: CPT

## 2020-04-22 RX ORDER — VANCOMYCIN HCL 1 G
1000 VIAL (EA) INTRAVENOUS ONCE
Refills: 0 | Status: COMPLETED | OUTPATIENT
Start: 2020-04-22 | End: 2020-04-22

## 2020-04-22 RX ORDER — TACROLIMUS 5 MG/1
1 CAPSULE ORAL
Refills: 0 | Status: DISCONTINUED | OUTPATIENT
Start: 2020-04-22 | End: 2020-04-23

## 2020-04-22 RX ORDER — HYDRALAZINE HCL 50 MG
50 TABLET ORAL THREE TIMES A DAY
Refills: 0 | Status: DISCONTINUED | OUTPATIENT
Start: 2020-04-22 | End: 2020-04-23

## 2020-04-22 RX ORDER — ACETAMINOPHEN 500 MG
650 TABLET ORAL EVERY 6 HOURS
Refills: 0 | Status: DISCONTINUED | OUTPATIENT
Start: 2020-04-22 | End: 2020-04-23

## 2020-04-22 RX ORDER — ERYTHROPOIETIN 10000 [IU]/ML
10000 INJECTION, SOLUTION INTRAVENOUS; SUBCUTANEOUS
Refills: 0 | Status: DISCONTINUED | OUTPATIENT
Start: 2020-04-22 | End: 2020-04-23

## 2020-04-22 RX ORDER — SEVELAMER CARBONATE 2400 MG/1
1600 POWDER, FOR SUSPENSION ORAL
Refills: 0 | Status: DISCONTINUED | OUTPATIENT
Start: 2020-04-22 | End: 2020-04-23

## 2020-04-22 RX ORDER — CHLORHEXIDINE GLUCONATE 213 G/1000ML
1 SOLUTION TOPICAL DAILY
Refills: 0 | Status: DISCONTINUED | OUTPATIENT
Start: 2020-04-22 | End: 2020-04-23

## 2020-04-22 RX ADMIN — ERYTHROPOIETIN 10000 UNIT(S): 10000 INJECTION, SOLUTION INTRAVENOUS; SUBCUTANEOUS at 20:57

## 2020-04-22 RX ADMIN — Medication 150 MILLIGRAM(S): at 10:03

## 2020-04-22 RX ADMIN — SEVELAMER CARBONATE 1600 MILLIGRAM(S): 2400 POWDER, FOR SUSPENSION ORAL at 17:08

## 2020-04-22 RX ADMIN — Medication 150 MILLIGRAM(S): at 01:17

## 2020-04-22 RX ADMIN — Medication 650 MILLIGRAM(S): at 23:46

## 2020-04-22 RX ADMIN — Medication 8 TABLET(S): at 12:18

## 2020-04-22 RX ADMIN — Medication 250 MILLIGRAM(S): at 23:46

## 2020-04-22 RX ADMIN — Medication 50 MILLIGRAM(S): at 12:26

## 2020-04-22 RX ADMIN — Medication 50 MILLIGRAM(S): at 23:46

## 2020-04-22 RX ADMIN — SEVELAMER CARBONATE 1600 MILLIGRAM(S): 2400 POWDER, FOR SUSPENSION ORAL at 13:52

## 2020-04-22 RX ADMIN — Medication 25 MILLIGRAM(S): at 05:59

## 2020-04-22 RX ADMIN — ABACAVIR 600 MILLIGRAM(S): 20 SOLUTION ORAL at 12:17

## 2020-04-22 RX ADMIN — DOLUTEGRAVIR SODIUM 50 MILLIGRAM(S): 25 TABLET, FILM COATED ORAL at 01:14

## 2020-04-22 RX ADMIN — BUDESONIDE AND FORMOTEROL FUMARATE DIHYDRATE 2 PUFF(S): 160; 4.5 AEROSOL RESPIRATORY (INHALATION) at 05:59

## 2020-04-22 RX ADMIN — ABACAVIR 600 MILLIGRAM(S): 20 SOLUTION ORAL at 01:15

## 2020-04-22 RX ADMIN — TACROLIMUS 2 MILLIGRAM(S): 5 CAPSULE ORAL at 10:03
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
## 2020-04-22 NOTE — CONSULT NOTE ADULT - PROBLEM SELECTOR RECOMMENDATION 9
TTE reviewed with Dr. MITCH Cole And Dr. Tapia. Patient has Moderate Aortic Stenosis, with significant LVH and a Hyperdynamic LV. There is also a small mobile echodensity on the MV which could be thrombus, fibrinous material, or vegetation. MAXWELL as an outpatient would be recommended. Will try to get records from [facility], but would recommend no further testing while as an in patient, and recommend that he follow up in our Valve Clinic in 3 months.  I discussed this with him, and he agrees. He will also follow up with Dr. LEONARDO Rm, whom he would like to re-establish care with.

## 2020-04-22 NOTE — CONSULT NOTE ADULT - ATTENDING COMMENTS
[facility] NEPHROLOGY  Alvarez Nascimento M.D.  Gregory Bennett D.O.  Cheryl Rodriguez M.D.  Kristina Gonzales, MSN, ANP-C    Telephone: [phone]  Facsimile: [phone]    [address] [place] [address]
The Structural Heart team was asked to evaluate for AS and consideration for TAVR.  He was previously evaluated at [facility] in [place], told "I am not ready yet", and was scheduled to repeat a TTE a the end of March, which was postponed in the setting of the COVID-19 pandemic.  He is admitted with anemia and dyspnea, for which he is planned for PRBC transfusion.  He repeated a TTE here, which I reviewed in detail with Dr Cole (Director of Structural Heart Echo), and demonstrates MODERATE aortic stenosis with an AMADO of 1.5.  I have recommended we see the patient in our outpatient TAVR clinic in 4 months--or sooner with any new symptoms.  He currently reports no angina or syncope; presumably the dyspnea is multifactorial, in the setting of his acute on chronic anemia and multiple comorbidities.  TTE also demonstrates normal LV function.  Dominik Tapia MD

## 2020-04-22 NOTE — CONSULT NOTE ADULT - PROBLEM SELECTOR RECOMMENDATION 4
Discussed the importance of continuing to take his antihypertensive medications, and he states he understands, and will do a better job of being compliant with his meds.

## 2020-04-22 NOTE — CONSULT NOTE ADULT - ASSESSMENT
54y Male with h/o ESRD on HD 2/2 HIVAN s/p DDRT @ [facility] on 4/13/15 now failed on HD, BK viremia off of cellcept presents with pre-syncope. Nephrology consulted for ESRD status.    1) ESRD on HD: Last HD on 4/20 as an outpatient at [place] [facility]. Plan for next maintenance HD today. Will continue with vancomycin 1 gram post-HD on HD days given concern for femoral cathter infection until more information can be obtained. Monitor electrolytes.    2) HTN with ESRD: BP uncontrolled. Increase UF with HD today. Can titrate hydralazine as needed. Monitor BP.    3) Anemia of renal disease: Hb low. Plan for PRBC with HD today. Start Epo 10K with HD. Monitor Hb.    4) Hyperphosphatemia: Start renvela 2 tabs with meals. Continue with low phosphorus diet. Monitor serum calcium and phosphorus.    5) s/p DDRT: Decrease tacrolimus to 1 mg twice daily (home dose). Further tapering as per outpatient transplant center (patient currently anuric).

## 2020-04-22 NOTE — PROGRESS NOTE ADULT - ASSESSMENT
54 year old male PMH HTN, Anemia, HIV (undetectable viral load, last checked 1/2020), ESRD S/P right renal transplant (on HD M/W/F, last session yesterday, 4kg removed), Mitral valve regurgitation S/P MVR, severe AS, Cerebral aneurysm (w/o rupture), Bradycardia S/P PPM presents to ED c/o dizziness with near syncope for several weeks but never fully lost consciousness. Reports symptoms happen on exertion, including when lifting up something light. No fevers or cough.  Started Vancomycin 4/17 for purulent discharge from fistula. Pt with uncontrolled HTN, noncompliant with HTN meds Carvedilol and Hydralazine. Denies SOB, CP, clear syncope, abdominal pain, N/V/D, or F/C. Pt was seen at another hospital in 10/2019 as a TAVR candidate, but was told that it needed to be arranged as an outpatient. Pt has not been evaluated outpt yet for TAVR. HBG low on arrival at 7.2.      Plan: Anemia of 7.2 with presyncope. Plan to transfuse 1 unit PRBC with HD on Wednesday. Had HD on Monday. Has perm cath in right leg.   Eval for hemolysis with retic, LDH and haptoglobin in AM. Guiac negative in ER.  House renal to be called by ER for HD session on Wednesday.     ID: Continue all HIV meds, not taking prednisone and not indicated at this time. COVID-19 nasal swab negative in ER, no need for isolation.   Has zero symptoms for COVID, no reason to retest.      CV: Severs AS: Needs TAVR team to follow in AM and arrange for outpatient schedule for TAVR.  Continue all BP meds. 54 year old male PMH HTN, Anemia, HIV (undetectable viral load, last checked 1/2020), ESRD S/P right renal transplant (on HD M/W/F, last session yesterday, 4kg removed), Mitral valve regurgitation S/P MVR, severe AS, Cerebral aneurysm (w/o rupture), Bradycardia S/P PPM presents to ED c/o dizziness with near syncope for several weeks but never fully lost consciousness. Reports symptoms happen on exertion, including when lifting up something light. No fevers or cough.  Started Vancomycin 4/17 for purulent discharge from fistula. Pt with uncontrolled HTN, noncompliant with HTN meds Carvedilol and Hydralazine. Denies SOB, CP, clear syncope, abdominal pain, N/V/D, or F/C. Pt was seen at another hospital in 10/2019 as a TAVR candidate, but was told that it needed to be arranged as an outpatient. Pt has not been evaluated outpt yet for TAVR. HBG low on arrival at 7.2.      Plan: Anemia of 7.2 with presyncope. Plan to transfuse 1 unit PRBC with HD today. Had HD on Monday. Has perm cath in right groin.    Eval for hemolysis with retic, LDH and haptoglobin in AM. Guiac negative in ER.  House renal to be called by ER for HD session on Wednesday.   Renal saw patient today on 2 [address].     ID: Continue all HIV meds, not taking prednisone and not indicated at this time. COVID-19 nasal swab negative in ER, no need for isolation.   Has zero symptoms for COVID, no reason to retest.      CV: Severs AS: Needs TAVR team to follow in AM and arrange for outpatient schedule for TAVR.  Continue all BP meds.   Pre-op TTE and cartoids ordered.     Discharge home in AM after PRBC AM Thursday.
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
## 2020-04-22 NOTE — CHART NOTE - NSCHARTNOTEFT_GEN_A_CORE
I, Dr. Gregory Bennett, obtained verbal consent from patient regarding continuation of dialysis while admitted to hospital. Patient educated on risks, benefits and alternatives of dialysis and gives verbal consent to proceed. Consent witnessed by Kristina Gonzales NP.

## 2020-04-22 NOTE — PROGRESS NOTE ADULT - SUBJECTIVE AND OBJECTIVE BOX
INTERVAL HPI/OVERNIGHT EVENTS:  Pt seen and examined at bedside.     Allergies/Intolerance: Ancef (Urticaria)  Ethylene oxide (Short breath)  Optiflux Dialyzer (Faint; Short breath)  vancomycin (Urticaria)      MEDICATIONS  (STANDING):  abacavir 600 milliGRAM(s) Oral daily  atovaquone 250 mG/proguanil 100 mG 750 Tablet(s) Oral daily  budesonide 160 MICROgram(s)/formoterol 4.5 MICROgram(s) Inhaler 2 Puff(s) Inhalation two times a day  dolutegravir 50 milliGRAM(s) Oral daily  hydrALAZINE 25 milliGRAM(s) Oral three times a day  lamiVUDine 150 milliGRAM(s) Oral two times a day  metoprolol succinate ER 25 milliGRAM(s) Oral daily  tacrolimus 2 milliGRAM(s) Oral two times a day    MEDICATIONS  (PRN):        ROS: all systems reviewed and wnl      PHYSICAL EXAMINATION:  Vital Signs Last 24 Hrs  T(C): 36.7 (22 Apr 2020 04:24), Max: 37 (21 Apr 2020 17:39)  T(F): 98 (22 Apr 2020 04:24), Max: 98.6 (21 Apr 2020 17:39)  HR: 97 (22 Apr 2020 05:53) (82 - 104)  BP: 161/77 (22 Apr 2020 05:53) (91/53 - 212/99)  BP(mean): --  RR: 20 (22 Apr 2020 05:53) (16 - 20)  SpO2: 98% (22 Apr 2020 05:53) (94% - 99%)  CAPILLARY BLOOD GLUCOSE          04-21 @ 07:01  -  04-22 @ 07:00  --------------------------------------------------------  IN: 240 mL / OUT: 0 mL / NET: 240 mL        GENERAL:   NECK: supple, No JVD  CHEST/LUNG: clear to auscultation bilaterally; no rales, rhonchi, or wheezing b/l  HEART: normal S1, S2  ABDOMEN: BS+, soft, ND, NT   EXTREMITIES:  pulses palpable; no clubbing, cyanosis, or edema b/l LEs  SKIN: no rashes or lesions      LABS:                        7.2    7.80  )-----------( 230      ( 21 Apr 2020 12:18 )             24.0     04-21    136  |  92<L>  |  58<H>  ----------------------------<  80  4.5   |  20<L>  |  9.61<H>    Ca    9.9      21 Apr 2020 12:18  Mg     2.5     04-21    TPro  9.1<H>  /  Alb  3.5  /  TBili  0.3  /  DBili  x   /  AST  11  /  ALT  6<L>  /  AlkPhos  75  04-21 INTERVAL HPI/OVERNIGHT EVENTS:  Pt seen and examined at bedside.     Allergies/Intolerance: Ancef (Urticaria)  Ethylene oxide (Short breath)  Optiflux Dialyzer (Faint; Short breath)  vancomycin (Urticaria)      MEDICATIONS  (STANDING):  abacavir 600 milliGRAM(s) Oral daily  atovaquone 250 mG/proguanil 100 mG 750 Tablet(s) Oral daily  budesonide 160 MICROgram(s)/formoterol 4.5 MICROgram(s) Inhaler 2 Puff(s) Inhalation two times a day  dolutegravir 50 milliGRAM(s) Oral daily  hydrALAZINE 25 milliGRAM(s) Oral three times a day  lamiVUDine 150 milliGRAM(s) Oral two times a day  metoprolol succinate ER 25 milliGRAM(s) Oral daily  tacrolimus 2 milliGRAM(s) Oral two times a day    MEDICATIONS  (PRN):        ROS: all systems reviewed and wnl      PHYSICAL EXAMINATION:  Vital Signs Last 24 Hrs  T(C): 36.7 (22 Apr 2020 04:24), Max: 37 (21 Apr 2020 17:39)  T(F): 98 (22 Apr 2020 04:24), Max: 98.6 (21 Apr 2020 17:39)  HR: 97 (22 Apr 2020 05:53) (82 - 104)  BP: 161/77 (22 Apr 2020 05:53) (91/53 - 212/99)  BP(mean): --  RR: 20 (22 Apr 2020 05:53) (16 - 20)  SpO2: 98% (22 Apr 2020 05:53) (94% - 99%)  CAPILLARY BLOOD GLUCOSE          04-21 @ 07:01  -  04-22 @ 07:00  --------------------------------------------------------  IN: 240 mL / OUT: 0 mL / NET: 240 mL        GENERAL: stable in bed, waiting for HD, no fevers or CP  NECK: supple, No JVD  CHEST/LUNG: clear to auscultation bilaterally; no rales, rhonchi, or wheezing b/l  HEART: normal S1, S2  ABDOMEN: BS+, soft, ND, NT   EXTREMITIES:  pulses palpable; no clubbing, cyanosis, or edema b/l LEs  SKIN: no rashes or lesions      LABS:                        7.2    7.80  )-----------( 230      ( 21 Apr 2020 12:18 )             24.0     04-21    136  |  92<L>  |  58<H>  ----------------------------<  80  4.5   |  20<L>  |  9.61<H>    Ca    9.9      21 Apr 2020 12:18  Mg     2.5     04-21    TPro  9.1<H>  /  Alb  3.5  /  TBili  0.3  /  DBili  x   /  AST  11  /  ALT  6<L>  /  AlkPhos  75  04-21

## 2020-04-23 ENCOUNTER — TRANSCRIPTION ENCOUNTER (OUTPATIENT)
Age: 55
End: 2020-04-23

## 2020-04-23 VITALS
SYSTOLIC BLOOD PRESSURE: 130 MMHG | HEART RATE: 79 BPM | DIASTOLIC BLOOD PRESSURE: 73 MMHG | OXYGEN SATURATION: 100 % | TEMPERATURE: 98 F | RESPIRATION RATE: 16 BRPM

## 2020-04-23 LAB
ANION GAP SERPL CALC-SCNC: 16 MMOL/L — SIGNIFICANT CHANGE UP (ref 5–17)
BUN SERPL-MCNC: 40 MG/DL — HIGH (ref 7–23)
CALCIUM SERPL-MCNC: 9.8 MG/DL — SIGNIFICANT CHANGE UP (ref 8.4–10.5)
CHLORIDE SERPL-SCNC: 95 MMOL/L — LOW (ref 96–108)
CO2 SERPL-SCNC: 25 MMOL/L — SIGNIFICANT CHANGE UP (ref 22–31)
CREAT SERPL-MCNC: 8.21 MG/DL — HIGH (ref 0.5–1.3)
GLUCOSE SERPL-MCNC: 79 MG/DL — SIGNIFICANT CHANGE UP (ref 70–99)
HCT VFR BLD CALC: 27.8 % — LOW (ref 39–50)
HGB BLD-MCNC: 8.5 G/DL — LOW (ref 13–17)
MCHC RBC-ENTMCNC: 27.4 PG — SIGNIFICANT CHANGE UP (ref 27–34)
MCHC RBC-ENTMCNC: 30.6 GM/DL — LOW (ref 32–36)
MCV RBC AUTO: 89.7 FL — SIGNIFICANT CHANGE UP (ref 80–100)
NRBC # BLD: 0 /100 WBCS — SIGNIFICANT CHANGE UP (ref 0–0)
PHOSPHATE SERPL-MCNC: 4.4 MG/DL — SIGNIFICANT CHANGE UP (ref 2.5–4.5)
PLATELET # BLD AUTO: 253 K/UL — SIGNIFICANT CHANGE UP (ref 150–400)
POTASSIUM SERPL-MCNC: 4.7 MMOL/L — SIGNIFICANT CHANGE UP (ref 3.5–5.3)
POTASSIUM SERPL-SCNC: 4.7 MMOL/L — SIGNIFICANT CHANGE UP (ref 3.5–5.3)
RBC # BLD: 3.1 M/UL — LOW (ref 4.2–5.8)
RBC # FLD: 15.4 % — HIGH (ref 10.3–14.5)
SODIUM SERPL-SCNC: 136 MMOL/L — SIGNIFICANT CHANGE UP (ref 135–145)
WBC # BLD: 6.71 K/UL — SIGNIFICANT CHANGE UP (ref 3.8–10.5)
WBC # FLD AUTO: 6.71 K/UL — SIGNIFICANT CHANGE UP (ref 3.8–10.5)

## 2020-04-23 PROCEDURE — 82435 ASSAY OF BLOOD CHLORIDE: CPT

## 2020-04-23 PROCEDURE — 87635 SARS-COV-2 COVID-19 AMP PRB: CPT

## 2020-04-23 PROCEDURE — 99261: CPT

## 2020-04-23 PROCEDURE — P9016: CPT

## 2020-04-23 PROCEDURE — 99285 EMERGENCY DEPT VISIT HI MDM: CPT

## 2020-04-23 PROCEDURE — 94640 AIRWAY INHALATION TREATMENT: CPT

## 2020-04-23 PROCEDURE — 80048 BASIC METABOLIC PNL TOTAL CA: CPT

## 2020-04-23 PROCEDURE — 86923 COMPATIBILITY TEST ELECTRIC: CPT

## 2020-04-23 PROCEDURE — 83605 ASSAY OF LACTIC ACID: CPT

## 2020-04-23 PROCEDURE — 85045 AUTOMATED RETICULOCYTE COUNT: CPT

## 2020-04-23 PROCEDURE — 86850 RBC ANTIBODY SCREEN: CPT

## 2020-04-23 PROCEDURE — 93880 EXTRACRANIAL BILAT STUDY: CPT

## 2020-04-23 PROCEDURE — 84132 ASSAY OF SERUM POTASSIUM: CPT

## 2020-04-23 PROCEDURE — 83735 ASSAY OF MAGNESIUM: CPT

## 2020-04-23 PROCEDURE — 93306 TTE W/DOPPLER COMPLETE: CPT

## 2020-04-23 PROCEDURE — 82272 OCCULT BLD FECES 1-3 TESTS: CPT

## 2020-04-23 PROCEDURE — 82947 ASSAY GLUCOSE BLOOD QUANT: CPT

## 2020-04-23 PROCEDURE — 85014 HEMATOCRIT: CPT

## 2020-04-23 PROCEDURE — 93005 ELECTROCARDIOGRAM TRACING: CPT

## 2020-04-23 PROCEDURE — 83880 ASSAY OF NATRIURETIC PEPTIDE: CPT

## 2020-04-23 PROCEDURE — 85027 COMPLETE CBC AUTOMATED: CPT

## 2020-04-23 PROCEDURE — 86900 BLOOD TYPING SEROLOGIC ABO: CPT

## 2020-04-23 PROCEDURE — 84100 ASSAY OF PHOSPHORUS: CPT

## 2020-04-23 PROCEDURE — 84295 ASSAY OF SERUM SODIUM: CPT

## 2020-04-23 PROCEDURE — 80053 COMPREHEN METABOLIC PANEL: CPT

## 2020-04-23 PROCEDURE — 82330 ASSAY OF CALCIUM: CPT

## 2020-04-23 PROCEDURE — 84484 ASSAY OF TROPONIN QUANT: CPT

## 2020-04-23 PROCEDURE — 71250 CT THORAX DX C-: CPT

## 2020-04-23 PROCEDURE — 82803 BLOOD GASES ANY COMBINATION: CPT

## 2020-04-23 PROCEDURE — 83615 LACTATE (LD) (LDH) ENZYME: CPT

## 2020-04-23 PROCEDURE — 86901 BLOOD TYPING SEROLOGIC RH(D): CPT

## 2020-04-23 PROCEDURE — 71046 X-RAY EXAM CHEST 2 VIEWS: CPT

## 2020-04-23 PROCEDURE — 83010 ASSAY OF HAPTOGLOBIN QUANT: CPT

## 2020-04-23 PROCEDURE — 93880 EXTRACRANIAL BILAT STUDY: CPT | Mod: 26

## 2020-04-23 PROCEDURE — 36430 TRANSFUSION BLD/BLD COMPNT: CPT

## 2020-04-23 RX ORDER — ALBUTEROL 90 UG/1
2 AEROSOL, METERED ORAL
Qty: 1 | Refills: 0
Start: 2020-04-23 | End: 2020-05-22

## 2020-04-23 RX ORDER — HYDRALAZINE HCL 50 MG
1 TABLET ORAL
Qty: 0 | Refills: 0 | DISCHARGE

## 2020-04-23 RX ORDER — CARVEDILOL PHOSPHATE 80 MG/1
1 CAPSULE, EXTENDED RELEASE ORAL
Qty: 60 | Refills: 0
Start: 2020-04-23 | End: 2020-05-22

## 2020-04-23 RX ORDER — CARVEDILOL PHOSPHATE 80 MG/1
1 CAPSULE, EXTENDED RELEASE ORAL
Qty: 0 | Refills: 0 | DISCHARGE

## 2020-04-23 RX ORDER — BUDESONIDE AND FORMOTEROL FUMARATE DIHYDRATE 160; 4.5 UG/1; UG/1
2 AEROSOL RESPIRATORY (INHALATION)
Qty: 120 | Refills: 0
Start: 2020-04-23 | End: 2020-05-22

## 2020-04-23 RX ORDER — HYDRALAZINE HCL 50 MG
1 TABLET ORAL
Qty: 90 | Refills: 0
Start: 2020-04-23 | End: 2020-05-22

## 2020-04-23 RX ORDER — SEVELAMER CARBONATE 2400 MG/1
2 POWDER, FOR SUSPENSION ORAL
Qty: 180 | Refills: 0
Start: 2020-04-23 | End: 2020-05-22

## 2020-04-23 RX ORDER — METOPROLOL TARTRATE 50 MG
50 TABLET ORAL DAILY
Refills: 0 | Status: DISCONTINUED | OUTPATIENT
Start: 2020-04-23 | End: 2020-04-23

## 2020-04-23 RX ORDER — TACROLIMUS 5 MG/1
1 CAPSULE ORAL
Qty: 60 | Refills: 0
Start: 2020-04-23 | End: 2020-05-22

## 2020-04-23 RX ORDER — CARVEDILOL PHOSPHATE 80 MG/1
25 CAPSULE, EXTENDED RELEASE ORAL EVERY 12 HOURS
Refills: 0 | Status: DISCONTINUED | OUTPATIENT
Start: 2020-04-23 | End: 2020-04-23

## 2020-04-23 RX ORDER — ALBUTEROL 90 UG/1
3 AEROSOL, METERED ORAL
Qty: 0 | Refills: 0 | DISCHARGE

## 2020-04-23 RX ADMIN — Medication 50 MILLIGRAM(S): at 06:17

## 2020-04-23 RX ADMIN — Medication 25 MILLIGRAM(S): at 06:17

## 2020-04-23 NOTE — DISCHARGE NOTE PROVIDER - NSDCMRMEDTOKEN_GEN_ALL_CORE_FT
abacavir 300 mg oral tablet: 2 tab(s) orally once a day (at bedtime)  albuterol 90 mcg/inh inhalation aerosol: 2 puff(s) inhaled 2 times a day, As Needed   budesonide-formoterol 160 mcg-4.5 mcg/inh inhalation aerosol: 2 puff(s) inhaled 2 times a day   carvedilol 25 mg oral tablet: 1 tab(s) orally every 12 hours  dapsone 100 mg oral tablet: 1 tab(s) orally once a day  hydrALAZINE 50 mg oral tablet: 1 tab(s) orally 3 times a day  lamiVUDine 150 mg oral tablet: 1 tab(s) orally once a day  sevelamer carbonate 800 mg oral tablet: 2 tab(s) orally 3 times a day (with meals)  tacrolimus 1 mg oral capsule: 1 cap(s) orally 2 times a day  Tivicay 50 mg oral tablet: 1 tab(s) orally once a day

## 2020-04-23 NOTE — DISCHARGE NOTE PROVIDER - CARE PROVIDER_API CALL
Giovanny Rm (MD)  Cardiovascular Disease; Nuclear Cardiology  [address] [place] [address]  Phone: [phone]  Fax: [phone]  Follow Up Time:     Dominik Tapia)  Interventional Cardiology  [address] [place] [address]  Phone: [phone]  Fax: [phone]  Follow Up Time:

## 2020-04-23 NOTE — DISCHARGE NOTE NURSING/CASE MANAGEMENT/SOCIAL WORK - PATIENT PORTAL LINK FT
You can access the FollowMyHealth Patient Portal offered by [facility] by registering at the following website: [email or web address]. By joining [employer]’s FollowMyHealth portal, you will also be able to view your health information using other applications (apps) compatible with our system.

## 2020-04-23 NOTE — DISCHARGE NOTE PROVIDER - NSDCCPCAREPLAN_GEN_ALL_CORE_FT
PRINCIPAL DISCHARGE DIAGNOSIS  Diagnosis: Near syncope  Assessment and Plan of Treatment: anemia  AS-F/u with TAVR clinic in 3 mos, [phone]  Follow up with Dr LUCINDA Rm      SECONDARY DISCHARGE DIAGNOSES  Diagnosis: Anemia  Assessment and Plan of Treatment: PRBC  Dialysis

## 2020-04-23 NOTE — PROGRESS NOTE ADULT - ASSESSMENT
Discharge home today. HGB better after PRBC to 8.5. Anemia of chronic disease. Had HD yesterday. TAVR team to follow as outpatient for possible TAVR. See med list and summary.

## 2020-04-23 NOTE — PROGRESS NOTE ADULT - ASSESSMENT
54y Male with h/o ESRD on HD 2/2 HIVAN s/p DDRT @ [facility] on 4/13/15 now failed on HD, BK viremia off of cellcept presents with pre-syncope. Nephrology consulted for ESRD status.    1) ESRD on HD: Last HD on 4/22 tolerated well with 2L removed. Plan for next maintenance HD on 4/24 as an outpatient given plans for discharge today. Will continue with vancomycin 1 gram post-HD on HD days given concern for femoral catheter infection. Monitor electrolytes.    2) HTN with ESRD: BP uncontrolled for which patient started on coreg 25 mg twice daily. Monitor BP.    3) Anemia of renal disease: Hb improved s/p PRBC. Continue with Epo 10K with HD. Monitor Hb.    4) Hyperphosphatemia: Phosphorus controlled. Continue with renvela 2 tabs with meals and low phosphorus diet. Monitor serum calcium and phosphorus.    5) s/p DDRT: Continue with tacrolimus 1 mg twice daily (home dose). Further tapering as per outpatient transplant center (patient currently anuric).

## 2020-04-23 NOTE — DISCHARGE NOTE PROVIDER - NSDCFUADDAPPT_GEN_ALL_CORE_FT
PLEASE MAKE AN APPOINTMENT WITH DR CRAIG IN 1-2 WEEKS.  YOU WILL NEED A  TRANSESOPHAGEAL ECHO    PLEASE MAKE AN APPOINTMENT WITH DR GARVIN IN 3 MONTH TO FOLLOW UP ON THE TAVR  PROCEDURE.

## 2020-04-23 NOTE — DISCHARGE NOTE PROVIDER - HOSPITAL COURSE
54y M with PMHx of HTN, Anemia, HIV (1/2020, last viral undetectable), ESRD S/P right renal transplant (on HD M/W/F, last session yesterday, 4kg removed), Mitral valve regurgitation S/P MVR, Moderate AS, Cerebral aneurysm (w/o rupture), Bradycardia S/P PPM p/w near syncope x multiple weeks, progressively worsening since today    syncope- anemia HB 7.2    r/o covid 19     Covid neg    7/22 PRBC on dialysis, anemia.  AS on echo, prior         Tavr eval : ': Aortic stenosis, moderate. Recommendation: TTE reviewed with Dr. MITCH Cole And Dr. Tapia. Patient has Moderate Aortic Stenosis, with significant LVH and a Hyperdynamic LV. There is also a small mobile echodensity on the MV which could be thrombus, fibrinous material, or vegetation. MAXWELL as an outpatient would be recommended. Will try to get records from [facility], but would recommend no further testing while as an in patient, and recommend that he follow up in our Valve Clinic in 3 months.  I discussed this with him, and he agrees. He will also follow up with Dr. LEONARDO Rm, whom he would like to re-establish care with. 54y M with PMHx of HTN, Anemia, HIV (1/2020, last viral undetectable), ESRD S/P right renal transplant (on HD M/W/F, last session yesterday, 4kg removed), Mitral valve regurgitation S/P MVR, Moderate AS, Cerebral aneurysm (w/o rupture), Bradycardia S/P PPM p/w near syncope x multiple weeks, progressively worsening since today.  Syncope with  anemia . Covid 19 negative.          Had PRBC and dialysis. Moderate AS on echo. HGB improved to 8.5 after PRBC. Discharged home. Follow-up with  TAVR team after discharge.          Tavr eval : Moderated aortic stenosis. Recommendation: TTE reviewed with Dr. MITCH Cole And Dr. Tapia. Patient has Moderate Aortic Stenosis, with significant LVH and a Hyperdynamic LV. There is also a small mobile echodensity on the MV which could be thrombus, fibrinous material, or vegetation. MAXWELL as an outpatient would be recommended. Will try to get records from [facility], but would recommend no further testing while as an in patient, and recommend that he follow up in our Valve Clinic in 3 months.  I discussed this with him, and he agrees. He will also follow up with Dr. LEONARDO Rm, whom he would like to re-establish care with. Discharge home, see med list.

## 2020-04-23 NOTE — DISCHARGE NOTE PROVIDER - CARE PROVIDERS DIRECT ADDRESSES
[email or web address]

## 2020-04-23 NOTE — PROGRESS NOTE ADULT - SUBJECTIVE AND OBJECTIVE BOX
INTERVAL HPI/OVERNIGHT EVENTS:  Pt seen and examined at bedside.     Allergies/Intolerance: Ancef (Urticaria)  Ethylene oxide (Short breath)  Optiflux Dialyzer (Faint; Short breath)  vancomycin (Urticaria)      MEDICATIONS  (STANDING):  abacavir 600 milliGRAM(s) Oral daily  atovaquone 250 mG/proguanil 100 mG 750 Tablet(s) Oral daily  budesonide 160 MICROgram(s)/formoterol 4.5 MICROgram(s) Inhaler 2 Puff(s) Inhalation two times a day  chlorhexidine 2% Cloths 1 Application(s) Topical daily  dolutegravir 50 milliGRAM(s) Oral daily  epoetin-neha-epbx (RETACRIT) Injectable 71544 Unit(s) IV Push <User Schedule>  hydrALAZINE 50 milliGRAM(s) Oral three times a day  lamiVUDine 150 milliGRAM(s) Oral two times a day  metoprolol succinate ER 25 milliGRAM(s) Oral daily  sevelamer carbonate 1600 milliGRAM(s) Oral three times a day with meals  tacrolimus 1 milliGRAM(s) Oral two times a day    MEDICATIONS  (PRN):  acetaminophen   Tablet .. 650 milliGRAM(s) Oral every 6 hours PRN Mild Pain (1 - 3)        ROS: all systems reviewed and wnl      PHYSICAL EXAMINATION:  Vital Signs Last 24 Hrs  T(C): 36.7 (23 Apr 2020 06:11), Max: 36.8 (22 Apr 2020 23:15)  T(F): 98.1 (23 Apr 2020 06:11), Max: 98.3 (22 Apr 2020 23:15)  HR: 81 (23 Apr 2020 06:11) (69 - 81)  BP: 167/81 (23 Apr 2020 06:11) (149/74 - 186/91)  BP(mean): 110 (23 Apr 2020 06:11) (110 - 120)  RR: 18 (23 Apr 2020 06:11) (16 - 18)  SpO2: 98% (23 Apr 2020 06:11) (96% - 100%)  CAPILLARY BLOOD GLUCOSE          04-22 @ 07:01  -  04-23 @ 07:00  --------------------------------------------------------  IN: 1540 mL / OUT: 3100 mL / NET: -1560 mL        GENERAL:   NECK: supple, No JVD  CHEST/LUNG: clear to auscultation bilaterally; no rales, rhonchi, or wheezing b/l  HEART: normal S1, S2  ABDOMEN: BS+, soft, ND, NT   EXTREMITIES:  pulses palpable; no clubbing, cyanosis, or edema b/l LEs  SKIN: no rashes or lesions      LABS:                        7.5    6.65  )-----------( 255      ( 22 Apr 2020 10:28 )             25.1     04-23    136  |  95<L>  |  40<H>  ----------------------------<  79  4.7   |  25  |  8.21<H>    Ca    9.8      23 Apr 2020 06:46  Phos  4.4     04-23  Mg     2.5     04-21    TPro  8.8<H>  /  Alb  3.4  /  TBili  0.2  /  DBili  x   /  AST  8<L>  /  ALT  7<L>  /  AlkPhos  70  04-22 INTERVAL HPI/OVERNIGHT EVENTS:  Pt seen and examined at bedside.     Allergies/Intolerance: Ancef (Urticaria)  Ethylene oxide (Short breath)  Optiflux Dialyzer (Faint; Short breath)  vancomycin (Urticaria)      MEDICATIONS  (STANDING):  abacavir 600 milliGRAM(s) Oral daily  atovaquone 250 mG/proguanil 100 mG 750 Tablet(s) Oral daily  budesonide 160 MICROgram(s)/formoterol 4.5 MICROgram(s) Inhaler 2 Puff(s) Inhalation two times a day  chlorhexidine 2% Cloths 1 Application(s) Topical daily  dolutegravir 50 milliGRAM(s) Oral daily  epoetin-neha-epbx (RETACRIT) Injectable 03681 Unit(s) IV Push <User Schedule>  hydrALAZINE 50 milliGRAM(s) Oral three times a day  lamiVUDine 150 milliGRAM(s) Oral two times a day  metoprolol succinate ER 25 milliGRAM(s) Oral daily  sevelamer carbonate 1600 milliGRAM(s) Oral three times a day with meals  tacrolimus 1 milliGRAM(s) Oral two times a day    MEDICATIONS  (PRN):  acetaminophen   Tablet .. 650 milliGRAM(s) Oral every 6 hours PRN Mild Pain (1 - 3)        ROS: all systems reviewed and wnl      PHYSICAL EXAMINATION:  Vital Signs Last 24 Hrs  T(C): 36.7 (23 Apr 2020 06:11), Max: 36.8 (22 Apr 2020 23:15)  T(F): 98.1 (23 Apr 2020 06:11), Max: 98.3 (22 Apr 2020 23:15)  HR: 81 (23 Apr 2020 06:11) (69 - 81)  BP: 167/81 (23 Apr 2020 06:11) (149/74 - 186/91)  BP(mean): 110 (23 Apr 2020 06:11) (110 - 120)  RR: 18 (23 Apr 2020 06:11) (16 - 18)  SpO2: 98% (23 Apr 2020 06:11) (96% - 100%)  CAPILLARY BLOOD GLUCOSE          04-22 @ 07:01  -  04-23 @ 07:00  --------------------------------------------------------  IN: 1540 mL / OUT: 3100 mL / NET: -1560 mL        GENERAL:no new complaints    NECK: supple, No JVD  CHEST/LUNG: clear to auscultation bilaterally; no rales, rhonchi, or wheezing b/l  HEART: normal S1, S2  ABDOMEN: BS+, soft, ND, NT   EXTREMITIES:  pulses palpable; no clubbing, cyanosis, or edema b/l LEs  SKIN: no rashes or lesions      LABS:                        7.5    6.65  )-----------( 255      ( 22 Apr 2020 10:28 )             25.1     04-23    136  |  95<L>  |  40<H>  ----------------------------<  79  4.7   |  25  |  8.21<H>    Ca    9.8      23 Apr 2020 06:46  Phos  4.4     04-23  Mg     2.5     04-21    TPro  8.8<H>  /  Alb  3.4  /  TBili  0.2  /  DBili  x   /  AST  8<L>  /  ALT  7<L>  /  AlkPhos  70  04-22

## 2020-04-23 NOTE — PROGRESS NOTE ADULT - SUBJECTIVE AND OBJECTIVE BOX
[facility] NEPHROLOGY- PROGRESS NOTE    54y Male with h/o ESRD on HD 2/2 HIVAN s/p DDRT @ [facility] on 4/13/15 now failed on HD, BK viremia off of cellcept presents with pre-syncope. Nephrology consulted for ESRD status.    REVIEW OF SYSTEMS:  Gen: no changes in weight  Cards: no chest pain  Resp: no dyspnea  GI: no nausea or vomiting or diarrhea  Vascular: no LE edema    Ancef (Urticaria)  Ethylene oxide (Short breath)  Optiflux Dialyzer (Faint; Short breath)  vancomycin (Urticaria)      Hospital Medications: Medications reviewed    VITALS:  T(F): 98.1 (04-23-20 @ 06:11), Max: 98.3 (04-22-20 @ 23:15)  HR: 81 (04-23-20 @ 06:11)  BP: 167/81 (04-23-20 @ 06:11)  RR: 18 (04-23-20 @ 06:11)  SpO2: 98% (04-23-20 @ 06:11)  Wt(kg): --  Height (cm): 185.42 (04-21 @ 11:07)  Weight (kg): 73.9 (04-21 @ 11:07)  BMI (kg/m2): 21.5 (04-21 @ 11:07)  BSA (m2): 1.97 (04-21 @ 11:07)    04-22 @ 07:01  -  04-23 @ 07:00  --------------------------------------------------------  IN: 1540 mL / OUT: 3100 mL / NET: -1560 mL    04-23 @ 07:01  -  04-23 @ 11:23  --------------------------------------------------------  IN: 240 mL / OUT: 0 mL / NET: 240 mL        PHYSICAL EXAM:    Gen: NAD, calm  Cards: RRR, +S1/S2, no M/G/R  Resp: CTA B/L  GI: soft, NT/ND, NABS  Vascular: no LE edema B/L, RUE AVF ligated, LLE AVG no bruit/thrill, RLQ allograft, R femoral permcath without discharge noted by exit site      LABS:  04-23    136  |  95<L>  |  40<H>  ----------------------------<  79  4.7   |  25  |  8.21<H>    Ca    9.8      23 Apr 2020 06:46  Phos  4.4     04-23  Mg     2.5     04-21    TPro  8.8<H>  /  Alb  3.4  /  TBili  0.2  /  DBili      /  AST  8<L>  /  ALT  7<L>  /  AlkPhos  70  04-22    Creatinine Trend: 8.21 <--, 11.62 <--, 9.61 <--                        8.5    6.71  )-----------( 253      ( 23 Apr 2020 06:46 )             27.8

## 2020-04-24 DIAGNOSIS — Z71.89 OTHER SPECIFIED COUNSELING: ICD-10-CM

## 2020-04-25 RX ORDER — EPINEPHRINE 0.3 MG/.3ML
3 INJECTION INTRAMUSCULAR; SUBCUTANEOUS
Qty: 1 | Refills: 0
Start: 2020-04-25 | End: 2020-05-24

## 2020-04-27 ENCOUNTER — APPOINTMENT (OUTPATIENT)
Dept: NEPHROLOGY | Facility: CLINIC | Age: 55
End: 2020-04-27
Payer: MEDICARE

## 2020-04-27 DIAGNOSIS — T86.12 KIDNEY TRANSPLANT FAILURE: ICD-10-CM

## 2020-04-27 DIAGNOSIS — Z94.0 OTHER LONG TERM (CURRENT) DRUG THERAPY: ICD-10-CM

## 2020-04-27 DIAGNOSIS — Z94.0 KIDNEY TRANSPLANT STATUS: ICD-10-CM

## 2020-04-27 DIAGNOSIS — Z79.899 OTHER LONG TERM (CURRENT) DRUG THERAPY: ICD-10-CM

## 2020-04-27 DIAGNOSIS — B20 HUMAN IMMUNODEFICIENCY VIRUS [HIV] DISEASE: ICD-10-CM

## 2020-04-27 PROCEDURE — 99214 OFFICE O/P EST MOD 30 MIN: CPT | Mod: 95

## 2020-04-27 NOTE — HISTORY OF PRESENT ILLNESS
[Medical Office: ([facility])___] : at the medical office located in  [Home] : at home, [unfilled] , at the time of the visit. [Patient] : the patient [Self] : self [FreeTextEntry1] : This visit is provided by telehealth using real time 2 way audio visual technology. This patient is located at home and the provider is located at the [facility] medical office at [address] [place] [address]. Patient participated in this visit.\par Verbal consent for this visit was given by patient\par Total duration of this visit which included conversation, lab review, medication review and clinical assessment and advice lasted approximately 25-30 minutes.\par \par Since his last visit to see me in 2018, patient reports he had no health insurance for a period, then was seeking care at [facility] and with Dr. Yeh in [place].\par He reports that most recently he was at [facility] for a week and was evaluated for SOB, was told to be COVID negative and was told he has moderate aortic stenosis. He met with Dominik Tapia, cardiologist and follows with Dr. Osbaldo Rm for out patient cardiology care.\par \par He started dialysis and is currently followed by Dr. Neptali Yeh at [place] dialysis unit. He has been feeling ok except for SOB on moving furniture etc.\par \par He plans to pursue retransplant at [place] transplant program (due to his HIV positive status) and has a potential live donor. \par \par He reports he is only on tacrolimus for immunosuppression and has been off prednisone. He reports continuation of HIV medications with modified doses and plans to seek ID follow up.\par \par Currently:\par \par Patient feels well\par Reviewed for acute symptoms-currently has none except for SOB on moderate to severe exertion.\par Blood pressure has been approx 150/70 and takes antihypertensive medications.\par Has lower extremity tunneled hemodialysis catheter for hemodialysis vascular access.\par Taking precautions to prevent COVID exposure\par I reviewed current home  blood pressure and immunosuppressive medications.\par \par On Telehealth visit:\par \par Patient appears comfortable\par No distress, not dyspneic\par Conscious, alert, oriented X 3\par Speech: Normal\par Other observations as noted\par Reviewed last lab data \par

## 2020-04-27 NOTE — ASSESSMENT
[FreeTextEntry1] : Clinical Impression:\par Kidney Transplant recipient, failed allograft, now being followed by Dr. Yeh for ESRD care.\par Immunosuppression- Reviewed and discussed\par HIV Disease\par HTN controlled\par Plan:\par Immunosuppression adjusted\par Continue current medications\par additional changes -Discussed implications of retransplantation and immunosuppression to prevent sensitization as well as rejection in failed allograft.\par Labs and follow up visit will be scheduled once he gets retransplanted or PRN.\par He will continue nephrology care meanwhile with Dr. Yeh.\par He is advised to get ID follow up for management of HIV disease.\par He will continue follow up with cardiologist regarding valvular heart disease management.\par Discussed COVID infection prevention strategies including handwashing, social distancing and monitoring for any signs or symptoms.\par \par

## 2020-06-01 ENCOUNTER — OUTPATIENT (OUTPATIENT)
Dept: OUTPATIENT SERVICES | Facility: HOSPITAL | Age: 55
LOS: 1 days | End: 2020-06-01
Payer: MEDICARE

## 2020-06-01 DIAGNOSIS — Z94.0 KIDNEY TRANSPLANT STATUS: Chronic | ICD-10-CM

## 2020-06-04 DIAGNOSIS — Z71.89 OTHER SPECIFIED COUNSELING: ICD-10-CM

## 2020-06-15 PROCEDURE — G9001: CPT

## 2020-08-11 NOTE — ED ADULT NURSE NOTE - CAS EDP DISCH DISPOSITION ADMI
[facility] Advancement Flap (Single) Text: The defect edges were debeveled with a #15 scalpel blade.  Given the location of the defect and the proximity to free margins a single advancement flap was deemed most appropriate.  Using a sterile surgical marker, an appropriate advancement flap was drawn incorporating the defect and placing the expected incisions within the relaxed skin tension lines where possible.    The area thus outlined was incised deep to adipose tissue with a #15 scalpel blade.  The skin margins were undermined to an appropriate distance in all directions utilizing iris scissors.

## 2020-10-01 PROBLEM — Z94.0 KIDNEY TRANSPLANT RECIPIENT: Status: ACTIVE | Noted: 2018-07-12

## 2021-02-08 NOTE — PROGRESS NOTE ADULT - ATTENDING COMMENTS
CABG x 2 in 2020. Patient currently in cardiac rehab.     Primary and secondary prevention discussed with patient:   -Low sodium cardiac diet   -exercise 30 min a day three days a week
Explained to the patient that he is at risk for stroke with BP being as elevated as it currently is. Patient refuses to go to emergency department. He denies any strokelike symptoms.
Patient refuses to go to ER, increase coreg to 25 mg BID and Lasix to 40 mg BID. Patient to notify office if SBP is not <160 by this afternoon. BMP in 2 weeks. F/U on Friday for B/P check. Discussed the importance of low-sodium diet.
[facility] NEPHROLOGY  Alvarez Nascimento M.D.  Gregory Bennett D.O.  Cheryl Rodriguez M.D.  Kristina Gonzales, MSN, ANP-C    Telephone: [phone]  Facsimile: [phone]    [address] [place] [address]

## 2022-01-20 NOTE — ED ADULT NURSE NOTE - CAS DISCH TRANSFER METHOD
Private car [facility] Counseling:  I discussed with the patient the risks of ustekinumab including but not limited to immunosuppression, malignancy, posterior leukoencephalopathy syndrome, and serious infections.  The patient understands that monitoring is required including a PPD at baseline and must alert us or the primary physician if symptoms of infection or other concerning signs are noted.
